# Patient Record
Sex: MALE | Race: WHITE
[De-identification: names, ages, dates, MRNs, and addresses within clinical notes are randomized per-mention and may not be internally consistent; named-entity substitution may affect disease eponyms.]

---

## 2018-09-26 PROBLEM — Z00.00 ENCOUNTER FOR PREVENTIVE HEALTH EXAMINATION: Status: ACTIVE | Noted: 2018-09-26

## 2018-12-03 ENCOUNTER — APPOINTMENT (OUTPATIENT)
Dept: UROLOGY | Facility: CLINIC | Age: 58
End: 2018-12-03
Payer: MEDICAID

## 2018-12-03 ENCOUNTER — TRANSCRIPTION ENCOUNTER (OUTPATIENT)
Age: 58
End: 2018-12-03

## 2018-12-03 ENCOUNTER — OUTPATIENT (OUTPATIENT)
Dept: OUTPATIENT SERVICES | Facility: HOSPITAL | Age: 58
LOS: 1 days | Discharge: HOME | End: 2018-12-03

## 2018-12-03 VITALS
HEIGHT: 71 IN | HEART RATE: 69 BPM | DIASTOLIC BLOOD PRESSURE: 72 MMHG | SYSTOLIC BLOOD PRESSURE: 114 MMHG | BODY MASS INDEX: 30.8 KG/M2 | WEIGHT: 220 LBS

## 2018-12-03 DIAGNOSIS — Z78.9 OTHER SPECIFIED HEALTH STATUS: ICD-10-CM

## 2018-12-03 DIAGNOSIS — Z12.5 ENCOUNTER FOR SCREENING FOR MALIGNANT NEOPLASM OF PROSTATE: ICD-10-CM

## 2018-12-03 PROCEDURE — 99204 OFFICE O/P NEW MOD 45 MIN: CPT

## 2018-12-03 RX ORDER — LORATADINE 10 MG/1
10 TABLET ORAL
Qty: 20 | Refills: 0 | Status: ACTIVE | COMMUNITY
Start: 2018-08-26

## 2018-12-03 RX ORDER — MELOXICAM 15 MG/1
15 TABLET ORAL
Qty: 14 | Refills: 0 | Status: ACTIVE | COMMUNITY
Start: 2018-07-01

## 2018-12-03 RX ORDER — IBUPROFEN 800 MG/1
800 TABLET, FILM COATED ORAL
Qty: 28 | Refills: 0 | Status: ACTIVE | COMMUNITY
Start: 2018-10-04

## 2018-12-03 RX ORDER — FLUTICASONE PROPIONATE 50 UG/1
50 SPRAY, METERED NASAL
Qty: 16 | Refills: 0 | Status: ACTIVE | COMMUNITY
Start: 2018-08-26

## 2018-12-03 NOTE — HISTORY OF PRESENT ILLNESS
[Currently Experiencing ___] :  [unfilled] [Urinary Retention] : urinary retention [Urinary Urgency] : urinary urgency [Urinary Frequency] : urinary frequency [Nocturia] : nocturia [Straining] : straining [Weak Stream] : weak stream [None] : None [FreeTextEntry1] : HALI MIMS is a 58 year old male with a past medical history of BPH with LUTS and had procedure done in 2016 by previous urologist . Presents to the office today for severe LUTS x 5 years, worsening over time. Patient last seen urologist 2 years ago and was told he has an enlarged prostate and had procedure to shrink prostate, which improved urinary condition but not resolved. Denies gross hematuria and dysuria. Quit smoking 2 years ago, smoked 2-3 packs a day 40 years. Denies family history of prostate, bladder, and renal cancer. \par \par took for flomax for 4 months in 2016 and said didn’t like because of retrograde ejaculation and weaker erections. \par \par [facility] chart records accessed and reviewed at [email or web address]  Findings summarized below: \par \par 5/23/2018\par -PSA 1.1\par -UA.- trace leukocytes, no blood\par -HgA1c.- 5.3 %\par \par IPSS Score=25\par Incomplete Emptying=4\par Frequency=4\par Intermittency=4\par Urgency=4\par Weak Stream=4\par Straining=3\par Nocturia=2\par QOL=MOSTLY UNSATISFIED\par  [Urinary Incontinence] : no urinary incontinence [Intermittency] : no intermittency [Post-Void Dribbling] : no post-void dribbling [Erectile Dysfunction] : no Erectile Dysfunction [Dysuria] : no dysuria [Hematuria - Gross] : no gross hematuria

## 2018-12-03 NOTE — ASSESSMENT
[FreeTextEntry1] : HALI MIMS is a 58 year old male with a past medical history of BPH with LUTS and had procedure done in 2016 by previous urologist . Presents to the office today for severe LUTS x 5 years, worsening over time. Patient last seen urologist 2 years ago and was told he has an enlarged prostate and had procedure to shrink prostate, which improved urinary condition but not resolved. Denies gross hematuria and dysuria. Quit smoking 2 years ago, smoked 2-3 packs a day 40 years. Denies family history of prostate, bladder, and renal cancer. \par \par 5/23/2018\par -PSA 1.1\par -UA.- trace leukocytes, no blood\par -HgA1c.- 5.3 %\par \par IPSS Score=25\par Incomplete Emptying=4\par Frequency=4\par Intermittency=4\par Urgency=4\par Weak Stream=4\par Straining=3\par Nocturia=2\par QOL=MOSTLY UNSATISFIED

## 2018-12-03 NOTE — PHYSICAL EXAM
[General Appearance - Well Developed] : well developed [General Appearance - Well Nourished] : well nourished [Normal Appearance] : normal appearance [Well Groomed] : well groomed [General Appearance - In No Acute Distress] : no acute distress [Edema] : no peripheral edema [Respiration, Rhythm And Depth] : normal respiratory rhythm and effort [Exaggerated Use Of Accessory Muscles For Inspiration] : no accessory muscle use [Abdomen Soft] : soft [Abdomen Tenderness] : non-tender [Costovertebral Angle Tenderness] : no ~M costovertebral angle tenderness [Normal Station and Gait] : the gait and station were normal for the patient's age [] : no rash [No Focal Deficits] : no focal deficits [Oriented To Time, Place, And Person] : oriented to person, place, and time [Affect] : the affect was normal [Mood] : the mood was normal [Not Anxious] : not anxious [No Prostate Nodules] : no prostate nodules [Prostate Size ___ gm] : prostate size [unfilled] gm [Skin Color & Pigmentation] : normal skin color and pigmentation

## 2018-12-03 NOTE — LETTER BODY
[Dear  ___] : Dear  [unfilled], [Consult Letter:] : I had the pleasure of evaluating your patient, [unfilled]. [Please see my note below.] : Please see my note below. [Consult Closing:] : Thank you very much for allowing me to participate in the care of this patient.  If you have any questions, please do not hesitate to contact me. [Sincerely,] : Sincerely, [FreeTextEntry2] : Dr. Zachary Pollard [FreeTextEntry1] : Will assess prostate via imaging. if smaller than 80g may benefet from Urolift as this will preserve sexual function and allow for better urine drainage [FreeTextEntry3] : Alonzo Wilkerson MD\par Director of Male Sexual Dysfunction and Urologic Prosthetics

## 2018-12-04 DIAGNOSIS — N40.1 BENIGN PROSTATIC HYPERPLASIA WITH LOWER URINARY TRACT SYMPTOMS: ICD-10-CM

## 2018-12-05 LAB
APPEARANCE: CLEAR
BILIRUBIN URINE: NEGATIVE
BLOOD URINE: NEGATIVE
COLOR: YELLOW
GLUCOSE QUALITATIVE U: NEGATIVE MG/DL
KETONES URINE: NEGATIVE
LEUKOCYTE ESTERASE URINE: NEGATIVE
NITRITE URINE: NEGATIVE
PH URINE: 6
PROTEIN URINE: NEGATIVE MG/DL
SPECIFIC GRAVITY URINE: 1.01
UROBILINOGEN URINE: 0.2 MG/DL (ref 0.2–?)

## 2018-12-06 LAB — BACTERIA UR CULT: NORMAL

## 2018-12-07 ENCOUNTER — OTHER (OUTPATIENT)
Age: 58
End: 2018-12-07

## 2018-12-09 ENCOUNTER — FORM ENCOUNTER (OUTPATIENT)
Age: 58
End: 2018-12-09

## 2018-12-10 ENCOUNTER — OUTPATIENT (OUTPATIENT)
Dept: OUTPATIENT SERVICES | Facility: HOSPITAL | Age: 58
LOS: 1 days | Discharge: HOME | End: 2018-12-10

## 2018-12-10 ENCOUNTER — APPOINTMENT (OUTPATIENT)
Dept: UROLOGY | Facility: CLINIC | Age: 58
End: 2018-12-10
Payer: MEDICAID

## 2018-12-10 VITALS
WEIGHT: 220 LBS | HEART RATE: 64 BPM | HEIGHT: 71 IN | BODY MASS INDEX: 30.8 KG/M2 | SYSTOLIC BLOOD PRESSURE: 126 MMHG | DIASTOLIC BLOOD PRESSURE: 82 MMHG

## 2018-12-10 DIAGNOSIS — N40.1 BENIGN PROSTATIC HYPERPLASIA WITH LOWER URINARY TRACT SYMPTOMS: ICD-10-CM

## 2018-12-10 PROCEDURE — 99213 OFFICE O/P EST LOW 20 MIN: CPT

## 2018-12-10 PROCEDURE — 51741 ELECTRO-UROFLOWMETRY FIRST: CPT

## 2018-12-10 PROCEDURE — 51798 US URINE CAPACITY MEASURE: CPT

## 2018-12-14 LAB
BILIRUB UR QL STRIP: NORMAL
CLARITY UR: CLEAR
COLLECTION METHOD: NORMAL
GLUCOSE UR-MCNC: NORMAL
HCG UR QL: NORMAL EU/DL
HGB UR QL STRIP.AUTO: NORMAL
KETONES UR-MCNC: NORMAL
LEUKOCYTE ESTERASE UR QL STRIP: NORMAL
NITRITE UR QL STRIP: NORMAL
PH UR STRIP: 6
PROT UR STRIP-MCNC: NORMAL
SP GR UR STRIP: 1

## 2019-01-23 ENCOUNTER — APPOINTMENT (OUTPATIENT)
Dept: UROLOGY | Facility: CLINIC | Age: 59
End: 2019-01-23
Payer: MEDICAID

## 2019-01-23 DIAGNOSIS — N40.1 BENIGN PROSTATIC HYPERPLASIA WITH LOWER URINARY TRACT SYMPMS: ICD-10-CM

## 2019-01-23 DIAGNOSIS — R35.0 FREQUENCY OF MICTURITION: ICD-10-CM

## 2019-01-23 DIAGNOSIS — N13.8 BENIGN PROSTATIC HYPERPLASIA WITH LOWER URINARY TRACT SYMPMS: ICD-10-CM

## 2019-01-23 PROCEDURE — 99212 OFFICE O/P EST SF 10 MIN: CPT | Mod: 25

## 2019-01-23 PROCEDURE — 52000 CYSTOURETHROSCOPY: CPT

## 2019-01-23 RX ORDER — TAMSULOSIN HYDROCHLORIDE 0.4 MG/1
0.4 CAPSULE ORAL
Qty: 30 | Refills: 3 | Status: ACTIVE | COMMUNITY
Start: 2019-01-23 | End: 1900-01-01

## 2019-01-23 RX ORDER — AMOXICILLIN AND CLAVULANATE POTASSIUM 875; 125 MG/1; MG/1
875-125 TABLET, COATED ORAL
Qty: 14 | Refills: 0 | Status: DISCONTINUED | COMMUNITY
Start: 2018-08-26 | End: 2019-01-23

## 2019-01-23 RX ORDER — AMOXICILLIN 500 MG/1
500 CAPSULE ORAL
Qty: 21 | Refills: 0 | Status: DISCONTINUED | COMMUNITY
Start: 2018-10-04 | End: 2019-01-23

## 2019-01-23 NOTE — HISTORY OF PRESENT ILLNESS
[FreeTextEntry1] : HALI MIMS is a 58 year old male with a past medical history of BPH with LUTS and had procedure done in 2016 by previous urologist. Presents to the office today for severe LUTS x 5 years, worsening over time. Patient last seen urologist 2 years ago and was told he has an enlarged prostate and had procedure to shrink prostate, which improved urinary condition but not resolved. Denies gross hematuria and dysuria. Quit smoking 2 years ago, smoked 2-3 packs a day 40 years. Denies family history of prostate, bladder, and renal cancer. \par \par 5/23/2018\par -PSA 1.1\par -UA.- trace leukocytes, no blood\par -HgA1c.- 5.3 %\par \par IPSS Score=25\par Incomplete Emptying=4\par Frequency=4\par Intermittency=4\par Urgency=4\par Weak Stream=4\par Straining=3\par Nocturia=2\par QOL=MOSTLY UNSATISFIED. \par \par Culture - Urine;- no growth\par \par US Bladder/renal-- Dec 2019 -- normal renal sonogram.  PVR 40ml. prostate 52g\par \par uroflow and pvr-- max flow 21ml/s, but erratic with many peaks and troughs and overall flat\par pvr 58 ml. \par \par cystoscopy today showed no urethral strictures and trilobar hypertrophy -- was very sensitive to cystoscopy

## 2019-01-23 NOTE — PHYSICAL EXAM
[General Appearance - Well Developed] : well developed [General Appearance - Well Nourished] : well nourished [Normal Appearance] : normal appearance [Well Groomed] : well groomed [General Appearance - In No Acute Distress] : no acute distress [Abdomen Soft] : soft [Abdomen Tenderness] : non-tender [Costovertebral Angle Tenderness] : no ~M costovertebral angle tenderness [No Prostate Nodules] : no prostate nodules [Prostate Size ___ gm] : prostate size [unfilled] gm [Skin Color & Pigmentation] : normal skin color and pigmentation [Edema] : no peripheral edema [] : no respiratory distress [Respiration, Rhythm And Depth] : normal respiratory rhythm and effort [Exaggerated Use Of Accessory Muscles For Inspiration] : no accessory muscle use [Oriented To Time, Place, And Person] : oriented to person, place, and time [Affect] : the affect was normal [Mood] : the mood was normal [Not Anxious] : not anxious [Normal Station and Gait] : the gait and station were normal for the patient's age [No Focal Deficits] : no focal deficits

## 2019-01-23 NOTE — REVIEW OF SYSTEMS
[see HPI] : see HPI [Dysuria] : no dysuria [Incontinence] : no incontinence [Nocturia] : nocturia [Negative] : Endocrine

## 2019-04-26 ENCOUNTER — APPOINTMENT (OUTPATIENT)
Dept: UROLOGY | Facility: CLINIC | Age: 59
End: 2019-04-26

## 2019-10-15 ENCOUNTER — TRANSCRIPTION ENCOUNTER (OUTPATIENT)
Age: 59
End: 2019-10-15

## 2019-12-11 ENCOUNTER — HOSPITAL ENCOUNTER (EMERGENCY)
Facility: HOSPITAL | Age: 59
Discharge: HOME | End: 2019-12-11
Attending: EMERGENCY MEDICINE | Admitting: EMERGENCY MEDICINE
Payer: COMMERCIAL

## 2019-12-11 ENCOUNTER — APPOINTMENT (EMERGENCY)
Dept: CT IMAGING | Facility: HOSPITAL | Age: 59
End: 2019-12-11
Payer: COMMERCIAL

## 2019-12-11 VITALS
OXYGEN SATURATION: 97 % | DIASTOLIC BLOOD PRESSURE: 85 MMHG | RESPIRATION RATE: 16 BRPM | WEIGHT: 223 LBS | TEMPERATURE: 98.2 F | HEART RATE: 78 BPM | SYSTOLIC BLOOD PRESSURE: 151 MMHG

## 2019-12-11 DIAGNOSIS — M54.2 NECK PAIN: ICD-10-CM

## 2019-12-11 DIAGNOSIS — M79.18 MUSCULOSKELETAL PAIN: ICD-10-CM
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
## 2019-12-11 DIAGNOSIS — M54.9 BACK PAIN: ICD-10-CM

## 2019-12-11 LAB
ANION GAP BLD CALC-SCNC: 15 MMOL/L (ref 4–13)
BUN BLD-MCNC: 16 MG/DL (ref 5–25)
CA-I BLD-SCNC: 1.23 MMOL/L (ref 1.12–1.32)
CHLORIDE BLD-SCNC: 107 MMOL/L (ref 100–108)
CREAT BLD-MCNC: 1 MG/DL (ref 0.6–1.3)
GFR SERPL CREATININE-BSD FRML MDRD: 83 ML/MIN/1.73SQ M
GLUCOSE SERPL-MCNC: 99 MG/DL (ref 65–140)
HCT VFR BLD CALC: 39 % (ref 36.5–49.3)
HGB BLDA-MCNC: 13.3 G/DL (ref 12–17)
PCO2 BLD: 27 MMOL/L (ref 21–32)
POTASSIUM BLD-SCNC: 3.7 MMOL/L (ref 3.5–5.3)
SODIUM BLD-SCNC: 144 MMOL/L (ref 136–145)
SPECIMEN SOURCE: ABNORMAL

## 2019-12-11 PROCEDURE — 72125 CT NECK SPINE W/O DYE: CPT

## 2019-12-11 PROCEDURE — 99285 EMERGENCY DEPT VISIT HI MDM: CPT | Performed by: EMERGENCY MEDICINE

## 2019-12-11 PROCEDURE — 99284 EMERGENCY DEPT VISIT MOD MDM: CPT

## 2019-12-11 PROCEDURE — 74177 CT ABD & PELVIS W/CONTRAST: CPT

## 2019-12-11 PROCEDURE — 80047 BASIC METABLC PNL IONIZED CA: CPT

## 2019-12-11 PROCEDURE — 70450 CT HEAD/BRAIN W/O DYE: CPT

## 2019-12-11 PROCEDURE — 85014 HEMATOCRIT: CPT

## 2019-12-11 PROCEDURE — 71260 CT THORAX DX C+: CPT

## 2019-12-11 PROCEDURE — 96374 THER/PROPH/DIAG INJ IV PUSH: CPT

## 2019-12-11 RX ORDER — IBUPROFEN 600 MG/1
600 TABLET ORAL EVERY 6 HOURS PRN
Qty: 28 TABLET | Refills: 0 | Status: SHIPPED | OUTPATIENT
Start: 2019-12-11 | End: 2019-12-18

## 2019-12-11 RX ORDER — FENTANYL CITRATE 50 UG/ML
50 INJECTION, SOLUTION INTRAMUSCULAR; INTRAVENOUS ONCE
Status: COMPLETED | OUTPATIENT
Start: 2019-12-11 | End: 2019-12-11

## 2019-12-11 RX ADMIN — IOHEXOL 100 ML: 350 INJECTION, SOLUTION INTRAVENOUS at 17:46

## 2019-12-11 RX ADMIN — FENTANYL CITRATE 50 MCG: 50 INJECTION, SOLUTION INTRAMUSCULAR; INTRAVENOUS at 18:05
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
## 2020-05-22 ENCOUNTER — OUTPATIENT (OUTPATIENT)
Dept: OUTPATIENT SERVICES | Facility: HOSPITAL | Age: 60
LOS: 1 days | Discharge: HOME | End: 2020-05-22

## 2020-05-22 DIAGNOSIS — H90.3 SENSORINEURAL HEARING LOSS, BILATERAL: ICD-10-CM

## 2020-06-21 ENCOUNTER — TRANSCRIPTION ENCOUNTER (OUTPATIENT)
Age: 60
End: 2020-06-21

## 2020-07-07 ENCOUNTER — OUTPATIENT (OUTPATIENT)
Dept: OUTPATIENT SERVICES | Facility: HOSPITAL | Age: 60
LOS: 1 days | Discharge: HOME | End: 2020-07-07

## 2020-07-07 DIAGNOSIS — H90.3 SENSORINEURAL HEARING LOSS, BILATERAL: ICD-10-CM

## 2020-10-01 ENCOUNTER — OUTPATIENT (OUTPATIENT)
Dept: OUTPATIENT SERVICES | Facility: HOSPITAL | Age: 60
LOS: 1 days | End: 2020-10-01
Payer: MEDICAID

## 2020-10-27 DIAGNOSIS — Z71.89 OTHER SPECIFIED COUNSELING: ICD-10-CM

## 2021-01-04 ENCOUNTER — TRANSCRIPTION ENCOUNTER (OUTPATIENT)
Age: 61
End: 2021-01-04

## 2021-01-04 ENCOUNTER — EMERGENCY (EMERGENCY)
Facility: HOSPITAL | Age: 61
LOS: 0 days | Discharge: HOME | End: 2021-01-04
Attending: EMERGENCY MEDICINE | Admitting: EMERGENCY MEDICINE
Payer: MEDICAID

## 2021-01-04 VITALS
OXYGEN SATURATION: 99 % | TEMPERATURE: 99 F | RESPIRATION RATE: 18 BRPM | SYSTOLIC BLOOD PRESSURE: 132 MMHG | HEART RATE: 87 BPM | DIASTOLIC BLOOD PRESSURE: 75 MMHG

## 2021-01-04 VITALS
HEART RATE: 81 BPM | SYSTOLIC BLOOD PRESSURE: 121 MMHG | OXYGEN SATURATION: 96 % | TEMPERATURE: 98 F | DIASTOLIC BLOOD PRESSURE: 78 MMHG

## 2021-01-04 DIAGNOSIS — R06.02 SHORTNESS OF BREATH: ICD-10-CM

## 2021-01-04 DIAGNOSIS — U07.1 COVID-19: ICD-10-CM

## 2021-01-04 LAB
ALBUMIN SERPL ELPH-MCNC: 4.2 G/DL — SIGNIFICANT CHANGE UP (ref 3.5–5.2)
ALP SERPL-CCNC: 91 U/L — SIGNIFICANT CHANGE UP (ref 30–115)
ALT FLD-CCNC: 20 U/L — SIGNIFICANT CHANGE UP (ref 0–41)
ANION GAP SERPL CALC-SCNC: 10 MMOL/L — SIGNIFICANT CHANGE UP (ref 7–14)
AST SERPL-CCNC: 21 U/L — SIGNIFICANT CHANGE UP (ref 0–41)
BASOPHILS # BLD AUTO: 0.02 K/UL — SIGNIFICANT CHANGE UP (ref 0–0.2)
BASOPHILS NFR BLD AUTO: 0.4 % — SIGNIFICANT CHANGE UP (ref 0–1)
BILIRUB SERPL-MCNC: <0.2 MG/DL — SIGNIFICANT CHANGE UP (ref 0.2–1.2)
BUN SERPL-MCNC: 15 MG/DL — SIGNIFICANT CHANGE UP (ref 10–20)
CALCIUM SERPL-MCNC: 9 MG/DL — SIGNIFICANT CHANGE UP (ref 8.5–10.1)
CHLORIDE SERPL-SCNC: 105 MMOL/L — SIGNIFICANT CHANGE UP (ref 98–110)
CO2 SERPL-SCNC: 24 MMOL/L — SIGNIFICANT CHANGE UP (ref 17–32)
CREAT SERPL-MCNC: 0.9 MG/DL — SIGNIFICANT CHANGE UP (ref 0.7–1.5)
EOSINOPHIL # BLD AUTO: 0.1 K/UL — SIGNIFICANT CHANGE UP (ref 0–0.7)
EOSINOPHIL NFR BLD AUTO: 2.2 % — SIGNIFICANT CHANGE UP (ref 0–8)
GLUCOSE SERPL-MCNC: 78 MG/DL — SIGNIFICANT CHANGE UP (ref 70–99)
HCT VFR BLD CALC: 45.3 % — SIGNIFICANT CHANGE UP (ref 42–52)
HGB BLD-MCNC: 14.5 G/DL — SIGNIFICANT CHANGE UP (ref 14–18)
IMM GRANULOCYTES NFR BLD AUTO: 0.2 % — SIGNIFICANT CHANGE UP (ref 0.1–0.3)
LYMPHOCYTES # BLD AUTO: 1.41 K/UL — SIGNIFICANT CHANGE UP (ref 1.2–3.4)
LYMPHOCYTES # BLD AUTO: 30.7 % — SIGNIFICANT CHANGE UP (ref 20.5–51.1)
MCHC RBC-ENTMCNC: 28.7 PG — SIGNIFICANT CHANGE UP (ref 27–31)
MCHC RBC-ENTMCNC: 32 G/DL — SIGNIFICANT CHANGE UP (ref 32–37)
MCV RBC AUTO: 89.7 FL — SIGNIFICANT CHANGE UP (ref 80–94)
MONOCYTES # BLD AUTO: 0.58 K/UL — SIGNIFICANT CHANGE UP (ref 0.1–0.6)
MONOCYTES NFR BLD AUTO: 12.6 % — HIGH (ref 1.7–9.3)
NEUTROPHILS # BLD AUTO: 2.48 K/UL — SIGNIFICANT CHANGE UP (ref 1.4–6.5)
NEUTROPHILS NFR BLD AUTO: 53.9 % — SIGNIFICANT CHANGE UP (ref 42.2–75.2)
NRBC # BLD: 0 /100 WBCS — SIGNIFICANT CHANGE UP (ref 0–0)
PLATELET # BLD AUTO: 250 K/UL — SIGNIFICANT CHANGE UP (ref 130–400)
POTASSIUM SERPL-MCNC: 4.8 MMOL/L — SIGNIFICANT CHANGE UP (ref 3.5–5)
POTASSIUM SERPL-SCNC: 4.8 MMOL/L — SIGNIFICANT CHANGE UP (ref 3.5–5)
PROT SERPL-MCNC: 7.2 G/DL — SIGNIFICANT CHANGE UP (ref 6–8)
RBC # BLD: 5.05 M/UL — SIGNIFICANT CHANGE UP (ref 4.7–6.1)
RBC # FLD: 13.2 % — SIGNIFICANT CHANGE UP (ref 11.5–14.5)
SODIUM SERPL-SCNC: 139 MMOL/L — SIGNIFICANT CHANGE UP (ref 135–146)
TROPONIN T SERPL-MCNC: <0.01 NG/ML — SIGNIFICANT CHANGE UP
WBC # BLD: 4.6 K/UL — LOW (ref 4.8–10.8)
WBC # FLD AUTO: 4.6 K/UL — LOW (ref 4.8–10.8)

## 2021-01-04 PROCEDURE — 71045 X-RAY EXAM CHEST 1 VIEW: CPT | Mod: 26

## 2021-01-04 PROCEDURE — 93010 ELECTROCARDIOGRAM REPORT: CPT

## 2021-01-04 PROCEDURE — 99285 EMERGENCY DEPT VISIT HI MDM: CPT

## 2021-01-04 RX ORDER — KETOROLAC TROMETHAMINE 30 MG/ML
30 SYRINGE (ML) INJECTION ONCE
Refills: 0 | Status: DISCONTINUED | OUTPATIENT
Start: 2021-01-04 | End: 2021-01-04

## 2021-01-04 RX ORDER — SODIUM CHLORIDE 9 MG/ML
1000 INJECTION INTRAMUSCULAR; INTRAVENOUS; SUBCUTANEOUS ONCE
Refills: 0 | Status: COMPLETED | OUTPATIENT
Start: 2021-01-04 | End: 2021-01-04

## 2021-01-04 RX ORDER — DEXAMETHASONE 0.5 MG/5ML
10 ELIXIR ORAL ONCE
Refills: 0 | Status: COMPLETED | OUTPATIENT
Start: 2021-01-04 | End: 2021-01-04

## 2021-01-04 RX ADMIN — SODIUM CHLORIDE 1000 MILLILITER(S): 9 INJECTION INTRAMUSCULAR; INTRAVENOUS; SUBCUTANEOUS at 15:08

## 2021-01-04 RX ADMIN — Medication 10 MILLIGRAM(S): at 15:00

## 2021-01-04 RX ADMIN — Medication 30 MILLIGRAM(S): at 15:09

## 2021-01-04 NOTE — ED PROVIDER NOTE - CARE PROVIDER_API CALL
Zachary Pollard  INTERNAL MEDICINE  [address] [place] [address]  Phone: [phone]  Fax: [phone]  Follow Up Time:

## 2021-01-04 NOTE — ED PROVIDER NOTE - PATIENT PORTAL LINK FT
You can access the FollowMyHealth Patient Portal offered by [facility] by registering at the following website: [email or web address]. By joining [facility]’s FollowMyHealth portal, you will also be able to view your health information using other applications (apps) compatible with our system.

## 2021-01-04 NOTE — ED PROVIDER NOTE - CLINICAL SUMMARY MEDICAL DECISION MAKING FREE TEXT BOX
ed work up unremarkable, pt feels better, dc home w supportive care, will place on TEAMS for f/u as outpt. strict return precautions provided.

## 2021-01-04 NOTE — ED PROVIDER NOTE - ATTENDING CONTRIBUTION TO CARE
60M no PMH, exsmoker, ex ETOH abuse sober x 5 years, covid + x 1 week, p/w fever, cough, body aches, lack of smell/taste, achy cp when coughing and sob. has a pulse ox just delivered today by the [facility] per pt. no abd pain, nvdc. no dysuria, freq, hematuria.     on exam, AFVSS, well wilbert nad, ncat, eomi, perrla, mmm, lctab, rrr nl s1s2 no mrg, abd soft ntnd, aaox3, no focal deficits, no le edema or calf ttp,     a/p; COVID, will do labs, ekg/trop, CXR, decadron re-eval. No resp distress or hypoxia

## 2021-01-04 NOTE — ED PROVIDER NOTE - OBJECTIVE STATEMENT
60y M no medical problems presenting with SOB, cough, body aches, headaches, chills x 4 days. Pt is +COVID 12/28. Sick contact with step father. No chest pain. No leg swelling. no abd pain, nvdc. no dysuria, freq, hematuria.

## 2021-01-04 NOTE — ED PROVIDER NOTE - NS ED ROS FT
Eyes:  No visual changes, eye pain or discharge.  ENMT:  No hearing changes, pain, no sore throat or runny nose, no difficulty swallowing  Cardiac:  No chest pain, or edema. No chest pain with exertion.  Respiratory:  No respiratory distress. No hemoptysis. No history of asthma or RAD.  GI:  No nausea, vomiting, diarrhea or abdominal pain.  :  No dysuria, frequency or burning.  MS:  see HPI  Neuro: No LOC.  Skin:  No skin rash.   Endocrine: No history of thyroid disease or diabetes.

## 2021-01-05 ENCOUNTER — TRANSCRIPTION ENCOUNTER (OUTPATIENT)
Age: 61
End: 2021-01-05

## 2021-01-08 ENCOUNTER — INPATIENT (INPATIENT)
Facility: HOSPITAL | Age: 61
LOS: 13 days | Discharge: HOME | End: 2021-01-22
Attending: INTERNAL MEDICINE | Admitting: INTERNAL MEDICINE
Payer: MEDICAID

## 2021-01-08 ENCOUNTER — TRANSCRIPTION ENCOUNTER (OUTPATIENT)
Age: 61
End: 2021-01-08

## 2021-01-08 VITALS
DIASTOLIC BLOOD PRESSURE: 83 MMHG | OXYGEN SATURATION: 99 % | TEMPERATURE: 98 F | RESPIRATION RATE: 20 BRPM | HEART RATE: 92 BPM | SYSTOLIC BLOOD PRESSURE: 138 MMHG

## 2021-01-08 LAB
ALBUMIN SERPL ELPH-MCNC: 3.8 G/DL — SIGNIFICANT CHANGE UP (ref 3.5–5.2)
ALP SERPL-CCNC: 98 U/L — SIGNIFICANT CHANGE UP (ref 30–115)
ALT FLD-CCNC: 28 U/L — SIGNIFICANT CHANGE UP (ref 0–41)
ANION GAP SERPL CALC-SCNC: 12 MMOL/L — SIGNIFICANT CHANGE UP (ref 7–14)
AST SERPL-CCNC: 33 U/L — SIGNIFICANT CHANGE UP (ref 0–41)
BASE EXCESS BLDV CALC-SCNC: 1.9 MMOL/L — SIGNIFICANT CHANGE UP (ref -2–2)
BASOPHILS # BLD AUTO: 0.02 K/UL — SIGNIFICANT CHANGE UP (ref 0–0.2)
BASOPHILS NFR BLD AUTO: 0.4 % — SIGNIFICANT CHANGE UP (ref 0–1)
BILIRUB SERPL-MCNC: 0.2 MG/DL — SIGNIFICANT CHANGE UP (ref 0.2–1.2)
BUN SERPL-MCNC: 13 MG/DL — SIGNIFICANT CHANGE UP (ref 10–20)
CA-I SERPL-SCNC: 1.22 MMOL/L — SIGNIFICANT CHANGE UP (ref 1.12–1.3)
CALCIUM SERPL-MCNC: 9.1 MG/DL — SIGNIFICANT CHANGE UP (ref 8.5–10.1)
CHLORIDE SERPL-SCNC: 103 MMOL/L — SIGNIFICANT CHANGE UP (ref 98–110)
CO2 SERPL-SCNC: 22 MMOL/L — SIGNIFICANT CHANGE UP (ref 17–32)
CREAT SERPL-MCNC: 0.8 MG/DL — SIGNIFICANT CHANGE UP (ref 0.7–1.5)
D DIMER BLD IA.RAPID-MCNC: 369 NG/ML DDU — HIGH (ref 0–230)
EOSINOPHIL # BLD AUTO: 0.06 K/UL — SIGNIFICANT CHANGE UP (ref 0–0.7)
EOSINOPHIL NFR BLD AUTO: 1.3 % — SIGNIFICANT CHANGE UP (ref 0–8)
GAS PNL BLDV: 141 MMOL/L — SIGNIFICANT CHANGE UP (ref 136–145)
GAS PNL BLDV: SIGNIFICANT CHANGE UP
GAS PNL BLDV: SIGNIFICANT CHANGE UP
GLUCOSE SERPL-MCNC: 82 MG/DL — SIGNIFICANT CHANGE UP (ref 70–99)
HCO3 BLDV-SCNC: 26 MMOL/L — SIGNIFICANT CHANGE UP (ref 22–29)
HCT VFR BLD CALC: 42.4 % — SIGNIFICANT CHANGE UP (ref 42–52)
HCT VFR BLDA CALC: 33.3 % — LOW (ref 34–44)
HGB BLD CALC-MCNC: 10.9 G/DL — LOW (ref 14–18)
HGB BLD-MCNC: 14.1 G/DL — SIGNIFICANT CHANGE UP (ref 14–18)
IMM GRANULOCYTES NFR BLD AUTO: 0.6 % — HIGH (ref 0.1–0.3)
LACTATE BLDV-MCNC: 1.7 MMOL/L — HIGH (ref 0.5–1.6)
LYMPHOCYTES # BLD AUTO: 1.5 K/UL — SIGNIFICANT CHANGE UP (ref 1.2–3.4)
LYMPHOCYTES # BLD AUTO: 32.2 % — SIGNIFICANT CHANGE UP (ref 20.5–51.1)
MCHC RBC-ENTMCNC: 28.4 PG — SIGNIFICANT CHANGE UP (ref 27–31)
MCHC RBC-ENTMCNC: 33.3 G/DL — SIGNIFICANT CHANGE UP (ref 32–37)
MCV RBC AUTO: 85.5 FL — SIGNIFICANT CHANGE UP (ref 80–94)
MONOCYTES # BLD AUTO: 0.55 K/UL — SIGNIFICANT CHANGE UP (ref 0.1–0.6)
MONOCYTES NFR BLD AUTO: 11.8 % — HIGH (ref 1.7–9.3)
NEUTROPHILS # BLD AUTO: 2.5 K/UL — SIGNIFICANT CHANGE UP (ref 1.4–6.5)
NEUTROPHILS NFR BLD AUTO: 53.7 % — SIGNIFICANT CHANGE UP (ref 42.2–75.2)
NRBC # BLD: 0 /100 WBCS — SIGNIFICANT CHANGE UP (ref 0–0)
NT-PROBNP SERPL-SCNC: 19 PG/ML — SIGNIFICANT CHANGE UP (ref 0–300)
PCO2 BLDV: 35 MMHG — LOW (ref 41–51)
PH BLDV: 7.47 — HIGH (ref 7.26–7.43)
PLATELET # BLD AUTO: 239 K/UL — SIGNIFICANT CHANGE UP (ref 130–400)
PO2 BLDV: 43 MMHG — HIGH (ref 20–40)
POTASSIUM BLDV-SCNC: 4.2 MMOL/L — SIGNIFICANT CHANGE UP (ref 3.3–5.6)
POTASSIUM SERPL-MCNC: 4.7 MMOL/L — SIGNIFICANT CHANGE UP (ref 3.5–5)
POTASSIUM SERPL-SCNC: 4.7 MMOL/L — SIGNIFICANT CHANGE UP (ref 3.5–5)
PROT SERPL-MCNC: 6.5 G/DL — SIGNIFICANT CHANGE UP (ref 6–8)
RBC # BLD: 4.96 M/UL — SIGNIFICANT CHANGE UP (ref 4.7–6.1)
RBC # FLD: 13.2 % — SIGNIFICANT CHANGE UP (ref 11.5–14.5)
SAO2 % BLDV: 82 % — SIGNIFICANT CHANGE UP
SARS-COV-2 RNA SPEC QL NAA+PROBE: DETECTED
SODIUM SERPL-SCNC: 137 MMOL/L — SIGNIFICANT CHANGE UP (ref 135–146)
TROPONIN T SERPL-MCNC: <0.01 NG/ML — SIGNIFICANT CHANGE UP
WBC # BLD: 4.66 K/UL — LOW (ref 4.8–10.8)
WBC # FLD AUTO: 4.66 K/UL — LOW (ref 4.8–10.8)

## 2021-01-08 PROCEDURE — 99291 CRITICAL CARE FIRST HOUR: CPT

## 2021-01-08 PROCEDURE — 99221 1ST HOSP IP/OBS SF/LOW 40: CPT | Mod: AI,GC

## 2021-01-08 PROCEDURE — 71275 CT ANGIOGRAPHY CHEST: CPT | Mod: 26

## 2021-01-08 PROCEDURE — 93010 ELECTROCARDIOGRAM REPORT: CPT

## 2021-01-08 PROCEDURE — 71045 X-RAY EXAM CHEST 1 VIEW: CPT | Mod: 26

## 2021-01-08 RX ORDER — DEXAMETHASONE 0.5 MG/5ML
6 ELIXIR ORAL DAILY
Refills: 0 | Status: DISCONTINUED | OUTPATIENT
Start: 2021-01-09 | End: 2021-01-09

## 2021-01-08 RX ORDER — ENOXAPARIN SODIUM 100 MG/ML
40 INJECTION SUBCUTANEOUS DAILY
Refills: 0 | Status: DISCONTINUED | OUTPATIENT
Start: 2021-01-08 | End: 2021-01-09

## 2021-01-08 RX ORDER — MORPHINE SULFATE 50 MG/1
2 CAPSULE, EXTENDED RELEASE ORAL EVERY 6 HOURS
Refills: 0 | Status: DISCONTINUED | OUTPATIENT
Start: 2021-01-08 | End: 2021-01-09

## 2021-01-08 RX ORDER — SODIUM CHLORIDE 9 MG/ML
1000 INJECTION, SOLUTION INTRAVENOUS ONCE
Refills: 0 | Status: COMPLETED | OUTPATIENT
Start: 2021-01-08 | End: 2021-01-08

## 2021-01-08 RX ORDER — MAGNESIUM SULFATE 500 MG/ML
2 VIAL (ML) INJECTION ONCE
Refills: 0 | Status: COMPLETED | OUTPATIENT
Start: 2021-01-08 | End: 2021-01-08

## 2021-01-08 RX ORDER — ALBUTEROL 90 UG/1
2 AEROSOL, METERED ORAL
Refills: 0 | Status: COMPLETED | OUTPATIENT
Start: 2021-01-08 | End: 2021-01-08

## 2021-01-08 RX ORDER — IPRATROPIUM/ALBUTEROL SULFATE 18-103MCG
9 AEROSOL WITH ADAPTER (GRAM) INHALATION ONCE
Refills: 0 | Status: COMPLETED | OUTPATIENT
Start: 2021-01-08 | End: 2021-01-08

## 2021-01-08 RX ORDER — MIDAZOLAM HYDROCHLORIDE 1 MG/ML
1 INJECTION, SOLUTION INTRAMUSCULAR; INTRAVENOUS ONCE
Refills: 0 | Status: DISCONTINUED | OUTPATIENT
Start: 2021-01-08 | End: 2021-01-08

## 2021-01-08 RX ORDER — FAMOTIDINE 10 MG/ML
20 INJECTION INTRAVENOUS DAILY
Refills: 0 | Status: DISCONTINUED | OUTPATIENT
Start: 2021-01-08 | End: 2021-01-09

## 2021-01-08 RX ORDER — IPRATROPIUM/ALBUTEROL SULFATE 18-103MCG
3 AEROSOL WITH ADAPTER (GRAM) INHALATION EVERY 6 HOURS
Refills: 0 | Status: DISCONTINUED | OUTPATIENT
Start: 2021-01-08 | End: 2021-01-22

## 2021-01-08 RX ORDER — CHLORHEXIDINE GLUCONATE 213 G/1000ML
1 SOLUTION TOPICAL
Refills: 0 | Status: DISCONTINUED | OUTPATIENT
Start: 2021-01-08 | End: 2021-01-22

## 2021-01-08 RX ORDER — TIOTROPIUM BROMIDE 18 UG/1
1 CAPSULE ORAL; RESPIRATORY (INHALATION) DAILY
Refills: 0 | Status: DISCONTINUED | OUTPATIENT
Start: 2021-01-08 | End: 2021-01-22

## 2021-01-08 RX ORDER — MORPHINE SULFATE 50 MG/1
4 CAPSULE, EXTENDED RELEASE ORAL ONCE
Refills: 0 | Status: DISCONTINUED | OUTPATIENT
Start: 2021-01-08 | End: 2021-01-08

## 2021-01-08 RX ADMIN — ALBUTEROL 2 PUFF(S): 90 AEROSOL, METERED ORAL at 16:22

## 2021-01-08 RX ADMIN — Medication 100 GRAM(S): at 17:42

## 2021-01-08 RX ADMIN — Medication 9 MILLILITER(S): at 17:43

## 2021-01-08 RX ADMIN — MIDAZOLAM HYDROCHLORIDE 1 MILLIGRAM(S): 1 INJECTION, SOLUTION INTRAMUSCULAR; INTRAVENOUS at 19:13

## 2021-01-08 RX ADMIN — Medication 125 MILLIGRAM(S): at 16:22

## 2021-01-08 RX ADMIN — MORPHINE SULFATE 4 MILLIGRAM(S): 50 CAPSULE, EXTENDED RELEASE ORAL at 22:19

## 2021-01-08 RX ADMIN — SODIUM CHLORIDE 1000 MILLILITER(S): 9 INJECTION, SOLUTION INTRAVENOUS at 16:22

## 2021-01-08 NOTE — ED PROVIDER NOTE - CLINICAL SUMMARY MEDICAL DECISION MAKING FREE TEXT BOX
pt presented for SOB, worsening, anxious, no PE on cta chest, 100 off o2, but increased wob, placed on bipap improved and improved off bipap, icu evaluated pt report regular floor, medical admitting ream aware of pt and admission.

## 2021-01-08 NOTE — ED ADULT NURSE NOTE - NSSUHOSCREENINGYN_ED_ALL_ED
----- Message from Kate Pandya sent at 8/13/2020  2:28 PM CDT -----  Regarding: Refill      Medication  Refill  Request      Please refill the medication(s) listed below. Please call the patient when the prescription(s) is ready for  at this phone number   [phone]       Medication #1   traMADoL (ULTRAM) 50 mg tablet      Preferred Pharmacy:   [facility]  [place] [address] [place] [phone] (Phone)  [phone] (Fax)        
Patient requesting refill on tramadol   Last office visit 07/28/20   shows last refill on 07/31/20  Patient does not have a pain contract on file with [facility]  Patient last UDS 02/08/20 was consistent with current therapy    
Yes - the patient is able to be screened

## 2021-01-08 NOTE — H&P ADULT - NSHPREVIEWOFSYSTEMS_GEN_ALL_CORE
CONSTITUTIONAL: As above  EYES/ENT: No visual changes;  No vertigo or throat pain   NECK: No pain or stiffness  RESPIRATORY: As above  CARDIOVASCULAR: No chest pain or palpitations  GASTROINTESTINAL: AS above  GENITOURINARY: No dysuria, frequency or hematuria  NEUROLOGICAL: No numbness or weakness  All other review of systems is negative unless indicated above.

## 2021-01-08 NOTE — H&P ADULT - NSHPLABSRESULTS_GEN_ALL_CORE
14.1   4.66  )-----------( 239      ( 08 Jan 2021 16:50 )             42.4       01-08    137  |  103  |  13  ----------------------------<  82  4.7   |  22  |  0.8    Ca    9.1      08 Jan 2021 16:50    TPro  6.5  /  Alb  3.8  /  TBili  0.2  /  DBili  x   /  AST  33  /  ALT  28  /  AlkPhos  98  01-08                      CARDIAC MARKERS ( 08 Jan 2021 16:50 )  x     / <0.01 ng/mL / x     / x     / x            CAPILLARY BLOOD GLUCOSE

## 2021-01-08 NOTE — ED PROVIDER NOTE - NS ED ROS FT
Constitutional: No fevers. +chills.   Eyes:  No visual changes, eye pain or discharge.  ENMT:  No sore throat or runny nose.  Cardiac:  +chest pain +sob  Respiratory:  +cough. hx of copd.   GI:  +diarrhea.   :  No dysuria, frequency or burning.  MS:  +myalgias.   Neuro:  No headache or weakness.  No LOC.  Skin:  No skin rash.   Endocrine: No history of thyroid disease or diabetes.

## 2021-01-08 NOTE — H&P ADULT - ATTENDING COMMENTS
HPI:  60 year old Male former smoker(2 packs since age of 17, quit 4 years ago) with past medical history of COPD not on home oxygen presents with worsening Shortness of Breath for one week.     Patient reports that since 27th he has been having intermittent fevers associated with dry cough, rigors, chills, loss of taste and smell. He tested positive on the 28th. For last one week he reports progressive shortness of breath, dry cough and increased inspiratory effort associated with sharp non radiating chest pain with deep inspiration. Patient also reports diarrhea for last 5 days watery, non bloody. He saw his PCP Dr. Pollard who referred him to ED. Patient denies any abdominal pain, hypoxia, numbness, tingling, dysuria or any other complaints.    In ED patient afebrile, hemodynamically stable, tachypneic to 30s placed on Bipap saturating 100% on 12/6/40%. CTA Chest negative for PE. In ED patient given one dose of Versed given to reduce the rate with improvement, Solumedrol 125, Magnesium and nebulizer treatments. At time of interview patient also anxious about prognosis and remains tachypneic. (08 Jan 2021 22:00)    REVIEW OF SYSTEMS:    CONSTITUTIONAL: No weakness, fevers or chills  EYES/ENT: No visual changes;  No vertigo or throat pain   NECK: No pain or stiffness  RESPIRATORY: No cough, wheezing, hemoptysis; No shortness of breath  CARDIOVASCULAR: No chest pain or palpitations  GASTROINTESTINAL: No abdominal or epigastric pain. No nausea, vomiting, or hematemesis; No diarrhea or constipation. No melena or hematochezia.  GENITOURINARY: No dysuria, frequency or hematuria  NEUROLOGICAL: No numbness or weakness  SKIN: No itching, rashes    Physical Exam:    General: WN/WD NAD  Neurology: A&Ox3, nonfocal, follows commands  Eyes: PERRLA/ EOMI  ENT/Neck: Neck supple, trachea midline, No JVD  Respiratory: CTA B/L, No wheezing, rales, rhonchi  CV: Normal rate regular rhythm, S1S2, no murmurs, rubs or gallops  Abdominal: Soft, NT, ND +BS,   Extremities: No edema, + peripheral pulses  Skin: No Rashes, Hematoma, Ecchymosis  Incisions:   Tubes: HPI:  60 year old Male former smoker(2 packs since age of 17, quit 4 years ago) with past medical history of COPD not on home oxygen presents with worsening Shortness of Breath for one week.     Patient reports that since 27th he has been having intermittent fevers associated with dry cough, rigors, chills, loss of taste and smell. He tested positive on the 28th. For last one week he reports progressive shortness of breath, dry cough and increased inspiratory effort associated with sharp non radiating chest pain with deep inspiration. Patient also reports diarrhea for last 5 days watery, non bloody. He saw his PCP Dr. Pollard who referred him to ED. Patient denies any abdominal pain, hypoxia, numbness, tingling, dysuria or any other complaints.    In ED patient afebrile, hemodynamically stable, tachypneic to 30s placed on Bipap saturating 100% on 12/6/40%. CTA Chest negative for PE. In ED patient given one dose of Versed given to reduce the rate with improvement, Solumedrol 125, Magnesium and nebulizer treatments. At time of interview patient also anxious about prognosis and remains tachypneic. (08 Jan 2021 22:00)    REVIEW OF SYSTEMS:  CONSTITUTIONAL: No weakness, fevers or chills  EYES/ENT: No visual changes;  No vertigo or throat pain   NECK: No pain or stiffness  RESPIRATORY: No cough, wheezing, hemoptysis; No shortness of breath  CARDIOVASCULAR: No chest pain or palpitations  GASTROINTESTINAL: No abdominal or epigastric pain. No nausea, vomiting, or hematemesis; No diarrhea or constipation. No melena or hematochezia.  GENITOURINARY: No dysuria, frequency or hematuria  NEUROLOGICAL: No numbness or weakness  SKIN: No itching, rashes    Physical Exam:  General: WN/WD NAD  Neurology: A&Ox3, nonfocal, follows commands  Eyes: PERRLA/ EOMI  ENT/Neck: Neck supple, trachea midline, No JVD  Respiratory: CTA B/L, No wheezing, rales, rhonchi  CV: Normal rate regular rhythm, S1S2, no murmurs, rubs or gallops  Abdominal: Soft, NT, ND +BS,   Extremities: No edema, + peripheral pulses  Skin: No Rashes, Hematoma, Ecchymosis  Incisions: n/a  Tubes: n/a     A/p  Acute resp failure w/ hypoxia 2/2 COVID -19 pneumonia   -Admit to floor / isolation   -IV Abx   -Dexamethasone  -c/w BiPAP and wean off O2 as tolerated   -check inflammatory markers  -ID eval     H/o COPD and hypoxic  - c/w inhalers for now  -pulse ox monitoring     DVT prophylaxis

## 2021-01-08 NOTE — ED PROVIDER NOTE - PROGRESS NOTE DETAILS
Pt signed out to Dr. Owusu pending work up and dispo. DC: Patient initially in urgent care. Patient's condition worsened, patient more tachypneic, resp distress, increased work of breathing, EKG nonischemic, CXR without pneumothorax, wheezing on exam bilaterally; patient moved to crit for BIPAP. Duonebs and mag being given for COPD exacerbation. DC: Patient remains tachypneic however work of breathing improved. Patient will be taken to CT shortly on nonrebreather and then run by the ICU. ED Attending ANNA Owusu  pt moved to crit, covid (+) on 12/28, hz of copd, presented for sob with chest pressure, ekg no laurie or depressions, received albuterol pump, wheezing on exam, saturation high 90's but with increased WOB, placed on bipap, nebz, mg, solumedrol, 1 liter of fluids given, xray reviewed, pending cta chest, pt aware of plan, will continue to monitor and reassess. ED Attending ANNA Owusu  pt moved to crit, covid (+) on 12/28, hx of copd, presented for sob with chest pressure, ekg no laurie or depressions, trop negative, received albuterol pump, wheezing on exam, saturation high 90's but with increased WOB, placed on bipap, nebz, mg, solumedrol, 1 liter of fluids given, xray reviewed, pending cta chest, pt aware of plan, will continue to monitor and reassess. DC: Patient off BIPAP. More comfortable after versed. not a candidate for the ICU as per Dr. Sotelo. DC: Patient off BIPAP. More comfortable after versed. not a candidate for the ICU as per Dr. Barajas.

## 2021-01-08 NOTE — H&P ADULT - NSHPPHYSICALEXAM_GEN_ALL_CORE
GENERAL: NAD, lying in bed comfortably  HEAD:  Atraumatic, Normocephalic  CHEST/LUNG: Clear to auscultation bilaterally, tachypneic  HEART: Regular rate and rhythm; No murmurs, rubs, or gallops  ABDOMEN: Bowel sounds present; Soft, Nontender, Nondistended.  EXTREMITIES:  2+ Peripheral Pulses, brisk capillary refill. No clubbing, cyanosis, or edema  NERVOUS SYSTEM: No Peripheral edema  MSK: FROM all 4 extremities, full and equal strength  SKIN: No rashes or lesions GENERAL: NAD, lying in bed comfortably  HEAD:  Atraumatic, Normocephalic  CHEST/LUNG: Clear to auscultation bilaterally, tachypneic  HEART: Regular rate and rhythm; No murmurs, rubs, or gallops  ABDOMEN: Bowel sounds present; Soft, Nontender, Nondistended.  EXTREMITIES: No Peripheral edema  NERVOUS SYSTEM: No Peripheral edema  MSK: FROM all 4 extremities, full and equal strength  SKIN: No rashes or lesions

## 2021-01-08 NOTE — ED PROVIDER NOTE - ATTENDING CONTRIBUTION TO CARE
60 y.o. M, PMH of COPD, diagnosed with COVID on 12/28, comes in with CP/SOB for 1 wk, getting worse, especially on exertion. Pt denies LE swelling/pain, hx of PE. Denies hx of cardiac disease. States has COPD but only uses pump as needed. On exam, pt in NAD, AAOx3, head NC/AT, CN II-XII intact, lungs CTA B/L, CV S1S2 regular, abdomen soft/NT/ND/(+)BS, ext (-) edema, motor 5/5x4, sensation intact. Will do labs, CXR, CT to r/o PE. Pt appears tachypneic. Will transfer to Crit for BIPAP.

## 2021-01-08 NOTE — H&P ADULT - ASSESSMENT
60 year old Male former smoker(2 packs since age of 17, quit 4 years ago) with past medical history of COPD not on home oxygen presents with worsening Shortness of Breath for one week.     Patient has shortness of breath secondary to COVID exacerbated by anxiety of prognosis. Patient counselled extensively and will give Morphine to reduce the breathing effort and help him breathe better.    # Shortness of Breath 2/2 COVID Pneumonia  - Febrile at home, afebrile in ED, tachypneic to 30s in ED  - Leukopenia present, D-dimer 369 COVID +ve  - CT shows bilateral ground-glass opacities  - s/p Solumedrol 125 ED  - Now on 12/6/40% saturating 100%  - use Bipap overnight  - Will continue with Dexamethasone 6 daily  - Out of window for plasma or Remdesivir  - Wean off Bipap when tachypnea better.   - Target Sat 92-96%  - Follow up Procalcitonin, CRP and Ferritin    # H/O COPD  - Will give Duoneb and Spiriva  - No wheezing on exam unlikely to be an exacerbation    DVT: Lovenox  Diet: DASH  GI: Famotidine  Dispo: Pending improvement in symptoms

## 2021-01-08 NOTE — ED ADULT NURSE NOTE - CHIEF COMPLAINT QUOTE
shortness of breath x 1 week, patient tested positive covid 1 week ago. Headache/diarrhea/body aches/ chest pressure. On assessment patient diaphoretic/ tachypneic. Pulse ox 100% on room air.

## 2021-01-08 NOTE — ED PROVIDER NOTE - PHYSICAL EXAMINATION
CONSTITUTIONAL: Well-developed; well-nourished; in no acute distress.   SKIN: warm, dry.  HEAD: Normocephalic; atraumatic.  EYES: PERRL, EOMI, no conjunctival erythema.  ENT: No nasal discharge; airway clear.  NECK: Supple; non tender.  CARD: S1, S2 normal; no murmurs, gallops, or rubs. Regular rate and rhythm.   RESP: tachypneic, increased work of breathing; normal breath sounds bilaterally. 02 sat 98% with ambulation.   ABD: soft ntnd  EXT: Normal ROM.  No clubbing, cyanosis or edema.   NEURO: Alert, oriented, grossly unremarkable.  PSYCH: Cooperative, appropriate.

## 2021-01-08 NOTE — ED PROVIDER NOTE - OBJECTIVE STATEMENT
Patient is a 59 yo M w/ hx of COPD p/w SOB. Patient has had COVID since Dec 28th, has had chills, cough, chest pain, SOB x 1 week; chest pain worse with inspiration, dull burn, non-radiating, constant. Patient denies hypoxia. Patient denies LE swelling/pain, hx of PE. Denies hx of cardiac disease. Instructed to come to ED by Dr. Pollard his PCP.

## 2021-01-08 NOTE — H&P ADULT - HISTORY OF PRESENT ILLNESS
60 year old Male former smoker(2 packs since age of 17, quit 4 years ago) with past medical history of COPD not on home oxygen presents with worsening Shortness of Breath for one week.     Patient reports that since 27th he has been having intermittent fevers associated with dry cough, rigors, chills, loss of taste and smell. He tested positive on the 28th. For last one week he reports progressive shortness of breath, dry cough and increased inspiratory effort associated with sharp non radiating chest pain with deep inspiration. Patient also reports diarrhea for last 5 days watery, non bloody. He saw his PCP Dr. Pollard who referred him to ED. Patient denies any abdominal pain, hypoxia, numbness, tingling, dysuria or any other complaints.    In ED patient afebrile, hemodynamically stable, tachypneic to 30s placed on Bipap saturating 100% on 12/6/40%. CTA Chest negative for PE. In ED patient given one dose of Versed given to reduce the rate with improvement, Solumedrol 125, Magnesium and nebulizer treatments. At time of interview patient also anxious about prognosis and remains tachypneic.

## 2021-01-09 LAB
A1C WITH ESTIMATED AVERAGE GLUCOSE RESULT: 5.9 % — HIGH (ref 4–5.6)
ALBUMIN SERPL ELPH-MCNC: 3.8 G/DL — SIGNIFICANT CHANGE UP (ref 3.5–5.2)
ALP SERPL-CCNC: 109 U/L — SIGNIFICANT CHANGE UP (ref 30–115)
ALT FLD-CCNC: 28 U/L — SIGNIFICANT CHANGE UP (ref 0–41)
ANION GAP SERPL CALC-SCNC: 10 MMOL/L — SIGNIFICANT CHANGE UP (ref 7–14)
AST SERPL-CCNC: 22 U/L — SIGNIFICANT CHANGE UP (ref 0–41)
BASOPHILS # BLD AUTO: 0.01 K/UL — SIGNIFICANT CHANGE UP (ref 0–0.2)
BASOPHILS NFR BLD AUTO: 0.2 % — SIGNIFICANT CHANGE UP (ref 0–1)
BILIRUB SERPL-MCNC: 0.5 MG/DL — SIGNIFICANT CHANGE UP (ref 0.2–1.2)
BUN SERPL-MCNC: 15 MG/DL — SIGNIFICANT CHANGE UP (ref 10–20)
CALCIUM SERPL-MCNC: 9.1 MG/DL — SIGNIFICANT CHANGE UP (ref 8.5–10.1)
CHLORIDE SERPL-SCNC: 103 MMOL/L — SIGNIFICANT CHANGE UP (ref 98–110)
CHOLEST SERPL-MCNC: 170 MG/DL — SIGNIFICANT CHANGE UP
CO2 SERPL-SCNC: 25 MMOL/L — SIGNIFICANT CHANGE UP (ref 17–32)
CREAT SERPL-MCNC: 0.8 MG/DL — SIGNIFICANT CHANGE UP (ref 0.7–1.5)
CRP SERPL-MCNC: 4.64 MG/DL — HIGH (ref 0–0.4)
EOSINOPHIL # BLD AUTO: 0 K/UL — SIGNIFICANT CHANGE UP (ref 0–0.7)
EOSINOPHIL NFR BLD AUTO: 0 % — SIGNIFICANT CHANGE UP (ref 0–8)
ESTIMATED AVERAGE GLUCOSE: 123 MG/DL — HIGH (ref 68–114)
FERRITIN SERPL-MCNC: 365 NG/ML — SIGNIFICANT CHANGE UP (ref 30–400)
GAS PNL BLDA: SIGNIFICANT CHANGE UP
GLUCOSE BLDC GLUCOMTR-MCNC: 127 MG/DL — HIGH (ref 70–99)
GLUCOSE SERPL-MCNC: 142 MG/DL — HIGH (ref 70–99)
HCT VFR BLD CALC: 42.4 % — SIGNIFICANT CHANGE UP (ref 42–52)
HCV AB S/CO SERPL IA: 0.03 COI — SIGNIFICANT CHANGE UP
HCV AB SERPL-IMP: SIGNIFICANT CHANGE UP
HDLC SERPL-MCNC: 44 MG/DL — SIGNIFICANT CHANGE UP
HGB BLD-MCNC: 14 G/DL — SIGNIFICANT CHANGE UP (ref 14–18)
IMM GRANULOCYTES NFR BLD AUTO: 0.7 % — HIGH (ref 0.1–0.3)
LACTATE SERPL-SCNC: 2.1 MMOL/L — HIGH (ref 0.7–2)
LIPID PNL WITH DIRECT LDL SERPL: 114 MG/DL — HIGH
LYMPHOCYTES # BLD AUTO: 0.82 K/UL — LOW (ref 1.2–3.4)
LYMPHOCYTES # BLD AUTO: 15.2 % — LOW (ref 20.5–51.1)
MAGNESIUM SERPL-MCNC: 2.2 MG/DL — SIGNIFICANT CHANGE UP (ref 1.8–2.4)
MCHC RBC-ENTMCNC: 28.5 PG — SIGNIFICANT CHANGE UP (ref 27–31)
MCHC RBC-ENTMCNC: 33 G/DL — SIGNIFICANT CHANGE UP (ref 32–37)
MCV RBC AUTO: 86.2 FL — SIGNIFICANT CHANGE UP (ref 80–94)
MONOCYTES # BLD AUTO: 0.21 K/UL — SIGNIFICANT CHANGE UP (ref 0.1–0.6)
MONOCYTES NFR BLD AUTO: 3.9 % — SIGNIFICANT CHANGE UP (ref 1.7–9.3)
NEUTROPHILS # BLD AUTO: 4.33 K/UL — SIGNIFICANT CHANGE UP (ref 1.4–6.5)
NEUTROPHILS NFR BLD AUTO: 80 % — HIGH (ref 42.2–75.2)
NON HDL CHOLESTEROL: 126 MG/DL — SIGNIFICANT CHANGE UP
NRBC # BLD: 0 /100 WBCS — SIGNIFICANT CHANGE UP (ref 0–0)
NT-PROBNP SERPL-SCNC: 105 PG/ML — SIGNIFICANT CHANGE UP (ref 0–300)
PLATELET # BLD AUTO: 272 K/UL — SIGNIFICANT CHANGE UP (ref 130–400)
POTASSIUM SERPL-MCNC: 4.8 MMOL/L — SIGNIFICANT CHANGE UP (ref 3.5–5)
POTASSIUM SERPL-SCNC: 4.8 MMOL/L — SIGNIFICANT CHANGE UP (ref 3.5–5)
PROCALCITONIN SERPL-MCNC: 0.07 NG/ML — SIGNIFICANT CHANGE UP (ref 0.02–0.1)
PROT SERPL-MCNC: 7.3 G/DL — SIGNIFICANT CHANGE UP (ref 6–8)
RBC # BLD: 4.92 M/UL — SIGNIFICANT CHANGE UP (ref 4.7–6.1)
RBC # FLD: 13.1 % — SIGNIFICANT CHANGE UP (ref 11.5–14.5)
SODIUM SERPL-SCNC: 138 MMOL/L — SIGNIFICANT CHANGE UP (ref 135–146)
TRIGL SERPL-MCNC: 85 MG/DL — SIGNIFICANT CHANGE UP
TROPONIN T SERPL-MCNC: <0.01 NG/ML — SIGNIFICANT CHANGE UP
WBC # BLD: 5.41 K/UL — SIGNIFICANT CHANGE UP (ref 4.8–10.8)
WBC # FLD AUTO: 5.41 K/UL — SIGNIFICANT CHANGE UP (ref 4.8–10.8)

## 2021-01-09 PROCEDURE — 93970 EXTREMITY STUDY: CPT | Mod: 26

## 2021-01-09 PROCEDURE — 71045 X-RAY EXAM CHEST 1 VIEW: CPT | Mod: 26

## 2021-01-09 RX ORDER — PANTOPRAZOLE SODIUM 20 MG/1
40 TABLET, DELAYED RELEASE ORAL DAILY
Refills: 0 | Status: DISCONTINUED | OUTPATIENT
Start: 2021-01-09 | End: 2021-01-11

## 2021-01-09 RX ORDER — MEROPENEM 1 G/30ML
1000 INJECTION INTRAVENOUS EVERY 8 HOURS
Refills: 0 | Status: DISCONTINUED | OUTPATIENT
Start: 2021-01-09 | End: 2021-01-09

## 2021-01-09 RX ORDER — FUROSEMIDE 40 MG
40 TABLET ORAL EVERY 12 HOURS
Refills: 0 | Status: COMPLETED | OUTPATIENT
Start: 2021-01-09 | End: 2021-01-10

## 2021-01-09 RX ORDER — MIDAZOLAM HYDROCHLORIDE 1 MG/ML
2 INJECTION, SOLUTION INTRAMUSCULAR; INTRAVENOUS ONCE
Refills: 0 | Status: DISCONTINUED | OUTPATIENT
Start: 2021-01-09 | End: 2021-01-09

## 2021-01-09 RX ORDER — ENOXAPARIN SODIUM 100 MG/ML
40 INJECTION SUBCUTANEOUS EVERY 12 HOURS
Refills: 0 | Status: DISCONTINUED | OUTPATIENT
Start: 2021-01-09 | End: 2021-01-22

## 2021-01-09 RX ORDER — DEXAMETHASONE 0.5 MG/5ML
6 ELIXIR ORAL DAILY
Refills: 0 | Status: DISCONTINUED | OUTPATIENT
Start: 2021-01-09 | End: 2021-01-21

## 2021-01-09 RX ORDER — VANCOMYCIN HCL 1 G
1500 VIAL (EA) INTRAVENOUS ONCE
Refills: 0 | Status: COMPLETED | OUTPATIENT
Start: 2021-01-09 | End: 2021-01-09

## 2021-01-09 RX ORDER — INFLUENZA VIRUS VACCINE 15; 15; 15; 15 UG/.5ML; UG/.5ML; UG/.5ML; UG/.5ML
0.5 SUSPENSION INTRAMUSCULAR ONCE
Refills: 0 | Status: DISCONTINUED | OUTPATIENT
Start: 2021-01-09 | End: 2021-01-22

## 2021-01-09 RX ORDER — MORPHINE SULFATE 50 MG/1
2 CAPSULE, EXTENDED RELEASE ORAL ONCE
Refills: 0 | Status: DISCONTINUED | OUTPATIENT
Start: 2021-01-09 | End: 2021-01-09

## 2021-01-09 RX ORDER — FUROSEMIDE 40 MG
40 TABLET ORAL DAILY
Refills: 0 | Status: DISCONTINUED | OUTPATIENT
Start: 2021-01-09 | End: 2021-01-09

## 2021-01-09 RX ORDER — MORPHINE SULFATE 50 MG/1
4 CAPSULE, EXTENDED RELEASE ORAL EVERY 4 HOURS
Refills: 0 | Status: DISCONTINUED | OUTPATIENT
Start: 2021-01-09 | End: 2021-01-16

## 2021-01-09 RX ADMIN — Medication 1 MILLIGRAM(S): at 09:02

## 2021-01-09 RX ADMIN — Medication 300 MILLIGRAM(S): at 12:07

## 2021-01-09 RX ADMIN — Medication 125 MILLIGRAM(S): at 11:07

## 2021-01-09 RX ADMIN — PANTOPRAZOLE SODIUM 40 MILLIGRAM(S): 20 TABLET, DELAYED RELEASE ORAL at 12:07

## 2021-01-09 RX ADMIN — CHLORHEXIDINE GLUCONATE 1 APPLICATION(S): 213 SOLUTION TOPICAL at 04:38

## 2021-01-09 RX ADMIN — ENOXAPARIN SODIUM 40 MILLIGRAM(S): 100 INJECTION SUBCUTANEOUS at 17:29

## 2021-01-09 RX ADMIN — MORPHINE SULFATE 4 MILLIGRAM(S): 50 CAPSULE, EXTENDED RELEASE ORAL at 17:30

## 2021-01-09 RX ADMIN — Medication 6 MILLIGRAM(S): at 04:40

## 2021-01-09 RX ADMIN — MEROPENEM 100 MILLIGRAM(S): 1 INJECTION INTRAVENOUS at 13:15

## 2021-01-09 RX ADMIN — MORPHINE SULFATE 4 MILLIGRAM(S): 50 CAPSULE, EXTENDED RELEASE ORAL at 20:13

## 2021-01-09 RX ADMIN — MORPHINE SULFATE 4 MILLIGRAM(S): 50 CAPSULE, EXTENDED RELEASE ORAL at 12:25

## 2021-01-09 RX ADMIN — MORPHINE SULFATE 2 MILLIGRAM(S): 50 CAPSULE, EXTENDED RELEASE ORAL at 08:45

## 2021-01-09 RX ADMIN — MEROPENEM 100 MILLIGRAM(S): 1 INJECTION INTRAVENOUS at 20:17

## 2021-01-09 RX ADMIN — Medication 6 MILLIGRAM(S): at 11:06

## 2021-01-09 RX ADMIN — Medication 40 MILLIGRAM(S): at 17:30

## 2021-01-09 NOTE — CHART NOTE - NSCHARTNOTEFT_GEN_A_CORE
RR/TRANSFER NOTE:    Was notified by RN during rounds that patient was tachypneic and desaturating to the 80s. Patient was proned; FIO2 increased to 100% on BIPAP. Patient was given 2mg of morphine IV push, total 3mg ativan, which helped in improving respiratory rate, and 125mg IV solumedrol. Otherwise, bp and hr was stable. ABG was done which showed ph 7.54 pco2 of 27 and po2 of 556, lactate was 1.9. Anesthesia was called but intubation was held off d/t improvement is tachypnea. Patient evaluated by critical care team and patient to be upgraded to ICU for further management. CXR was done - f/u read.    Vitals:  T(C): 35.8 (09 Jan 2021 08:00), Max: 36.8 (08 Jan 2021 15:39)  T(F): 96.4 (09 Jan 2021 08:00), Max: 98.3 (08 Jan 2021 15:39)  HR: 64 (09 Jan 2021 08:00) (64 - 92)  BP: 134/72 (09 Jan 2021 08:00) (128/81 - 139/85)  BP(mean): 105 (08 Jan 2021 21:02) (105 - 105)  RR: 18 (09 Jan 2021 08:00) (17 - 30)  SpO2: 99% (09 Jan 2021 08:00) (96% - 100%)    #Acute hypoxemic resp failure likely 2/2 COVID PNA - cannot r/o bacterial superinfection  # h/o COPD not on home O2  - cont w dexa 6mg daily  - cont w ativan prn for anxiety and tachypnea  - started on meropenem for empiric bacterial coverage  - f/u inflammatory markers  - f/u CXR read and AM routine labs  - f/u ID recs  - likely low threshold for intubation (if intubated - point of contact is brother Chon)    *case discussed by pulm fellow and attending*  **sign out given to ICU senior Dr. Trevizo [id number]** RR/TRANSFER NOTE:    Was notified by RN during rounds that patient was tachypneic and desaturating to the 80s. Patient was proned; FIO2 increased to 100% on BIPAP. Patient was given 2mg IV morphine push, total of 3mg ativan (which helped in improving respiratory rate) and 125mg IV solumedrol. Otherwise, BP and HR was stable. CXR done (read pending); ABG showed ph 7.54, pco2 27, po2 556, lactate 1.9. Anesthesia called but intubation was held off d/t improvement in tachypnea. Patient evaluated by critical care team and patient approved for upgraded to ICU for further management.    Vitals:  T(C): 35.8 (09 Jan 2021 08:00), Max: 36.8 (08 Jan 2021 15:39)  T(F): 96.4 (09 Jan 2021 08:00), Max: 98.3 (08 Jan 2021 15:39)  HR: 64 (09 Jan 2021 08:00) (64 - 92)  BP: 134/72 (09 Jan 2021 08:00) (128/81 - 139/85)  BP(mean): 105 (08 Jan 2021 21:02) (105 - 105)  RR: 18 (09 Jan 2021 08:00) (17 - 30)  SpO2: 99% (09 Jan 2021 08:00) (96% - 100%)    #Acute hypoxemic resp failure likely 2/2 COVID PNA - cannot r/o bacterial superinfection  # h/o COPD not on home O2  - cont w dexa 6mg daily  - cont w ativan prn for anxiety and tachypnea  - started on meropenem for empiric bacterial coverage  - f/u inflammatory markers  - f/u CXR read and AM routine labs  - f/u ID recs  - likely low threshold for intubation (if intubated - point of contact is brother Chon)    *case discussed by pulm fellow and attending*  **sign out given to ICU senior Dr. Trevizo [id number]** RR/TRANSFER NOTE:    Was notified by RN during rounds that patient was tachypneic and desaturating to the 80s. Patient was proned; FIO2 increased to 100% on BIPAP. Patient was given 2mg IV morphine push, total of 3mg ativan (which helped in improving respiratory rate) and 125mg IV solumedrol. Otherwise, BP and HR was stable. CXR done (read pending); ABG showed ph 7.54, pco2 27, po2 556, lactate 1.9. Anesthesia called but intubation was held off d/t improvement in tachypnea. Patient evaluated by critical care team and patient approved for upgraded to ICU for further management.    Vitals:  T(C): 35.8 (09 Jan 2021 08:00), Max: 36.8 (08 Jan 2021 15:39)  T(F): 96.4 (09 Jan 2021 08:00), Max: 98.3 (08 Jan 2021 15:39)  HR: 64 (09 Jan 2021 08:00) (64 - 92)  BP: 134/72 (09 Jan 2021 08:00) (128/81 - 139/85)  BP(mean): 105 (08 Jan 2021 21:02) (105 - 105)  RR: 18 (09 Jan 2021 08:00) (17 - 30)  SpO2: 99% (09 Jan 2021 08:00) (96% - 100%)    #Acute hypoxemic resp failure likely 2/2 COVID PNA - cannot r/o bacterial superinfection  # h/o COPD not on home O2  - cont w dexa 6mg daily  - cont w ativan prn for anxiety and tachypnea  - started on meropenem and vancomycin for empiric bacterial coverage  - f/u inflammatory markers and blood cx  - f/u CXR read and AM routine labs  - f/u LE duplex  - f/u ID recs  - likely low threshold for intubation (if intubated - point of contact is brother Chon)    *case discussed by pulm fellow and attending*  **sign out given to ICU senior Dr. Trevizo [id number]** RR/TRANSFER NOTE:    Transfer from: 4B- medical floor    Transfer to: ICU    Reason for transfer: Acute hypoxemic respiratory failure    HPI:  60 year old Male former smoker(2 packs since age of 17, quit 4 years ago) with past medical history of COPD not on home oxygen presents with worsening Shortness of Breath for one week.   Patient reports that since 27th he has been having intermittent fevers associated with dry cough, rigors, chills, loss of taste and smell. He tested positive on the 28th. For last one week he reports progressive shortness of breath, dry cough and increased inspiratory effort associated with sharp non radiating chest pain with deep inspiration. Patient also reports diarrhea for last 5 days watery, non bloody. He saw his PCP Dr. Pollard who referred him to ED. Patient denies any abdominal pain, hypoxia, numbness, tingling, dysuria or any other complaints.  In ED patient afebrile, hemodynamically stable, tachypneic to 30s placed on Bipap saturating 100% on 12/6/40%. CTA Chest negative for PE. In ED patient given one dose of Versed given to reduce the rate with improvement, Solumedrol 125, Magnesium and nebulizer treatments. At time of interview patient also anxious about prognosis and remains tachypneic. (08 Jan 2021 22:00)    4B course:  Was notified by RN during rounds that patient was tachypneic and desaturating to the 80s. Patient was proned; FIO2 increased to 100% on BIPAP. Patient was given 2mg IV morphine push, total of 3mg ativan (which helped in improving respiratory rate) and 125mg IV solumedrol. Otherwise, BP and HR was stable. CXR done (read pending); ABG showed ph 7.54, pco2 27, po2 556, lactate 1.9. Anesthesia called but intubation was held off d/t improvement in tachypnea. Patient evaluated by critical care team and patient approved for upgraded to ICU for further management.    Vitals:  T(C): 35.8 (09 Jan 2021 08:00), Max: 36.8 (08 Jan 2021 15:39)  T(F): 96.4 (09 Jan 2021 08:00), Max: 98.3 (08 Jan 2021 15:39)  HR: 64 (09 Jan 2021 08:00) (64 - 92)  BP: 134/72 (09 Jan 2021 08:00) (128/81 - 139/85)  BP(mean): 105 (08 Jan 2021 21:02) (105 - 105)  RR: 18 (09 Jan 2021 08:00) (17 - 30)  SpO2: 99% (09 Jan 2021 08:00) (96% - 100%)    P/E: Not wheezing on exam; anxious with increased work fo breathing; dec BS b/l    #Acute hypoxemic resp failure likely 2/2 COVID PNA - cannot r/o bacterial superinfection  # h/o COPD not on home O2  - cont w dexa 6mg daily  - cont w ativan prn for anxiety and tachypnea  - started on meropenem and vancomycin for empiric bacterial coverage  - f/u inflammatory markers and blood cx  - f/u CXR read and AM routine labs  - f/u LE duplex  - f/u ID recs  - likely low threshold for intubation (if intubated - point of contact is brother Chon)    FOLLOW UP  [ ] CXR read  [ ] blood cx, inflam markers, AM labs and CXR   [ ] Duplex LE b/l   [ ] ID follow up     *case discussed by pulm fellow and attending*  **sign out given to ICU senior Dr. Trevizo [id number]** RR/TRANSFER NOTE:    Transfer from: 4B- medical floor    Transfer to: ICU    Reason for transfer: Acute hypoxemic respiratory failure    HPI:  60 year old Male former smoker(2 packs since age of 17, quit 4 years ago) with past medical history of COPD not on home oxygen presents with worsening Shortness of Breath for one week.   Patient reports that since 27th he has been having intermittent fevers associated with dry cough, rigors, chills, loss of taste and smell. He tested positive on the 28th. For last one week he reports progressive shortness of breath, dry cough and increased inspiratory effort associated with sharp non radiating chest pain with deep inspiration. Patient also reports diarrhea for last 5 days watery, non bloody. He saw his PCP Dr. Pollard who referred him to ED. Patient denies any abdominal pain, hypoxia, numbness, tingling, dysuria or any other complaints.  In ED patient afebrile, hemodynamically stable, tachypneic to 30s placed on Bipap saturating 100% on 12/6/40%. CTA Chest negative for PE. In ED patient given one dose of Versed given to reduce the rate with improvement, Solumedrol 125, Magnesium and nebulizer treatments. At time of interview patient also anxious about prognosis and remains tachypneic. (08 Jan 2021 22:00)    4B course:  Was notified by RN during rounds that patient was tachypneic and desaturating to the 80s. Patient was proned; FIO2 increased to 100% on BIPAP. Patient was given 2mg IV morphine push, total of 3mg ativan (which helped in improving respiratory rate) and 125mg IV solumedrol. Otherwise, BP and HR was stable. CXR done (read pending); ABG showed ph 7.54, pco2 27, po2 556, lactate 1.9. Anesthesia called but intubation was held off d/t improvement in tachypnea. Patient evaluated by critical care team and patient approved for upgraded to ICU for further management.    Vitals:  T(C): 35.8 (09 Jan 2021 08:00), Max: 36.8 (08 Jan 2021 15:39)  T(F): 96.4 (09 Jan 2021 08:00), Max: 98.3 (08 Jan 2021 15:39)  HR: 64 (09 Jan 2021 08:00) (64 - 92)  BP: 134/72 (09 Jan 2021 08:00) (128/81 - 139/85)  BP(mean): 105 (08 Jan 2021 21:02) (105 - 105)  RR: 18 (09 Jan 2021 08:00) (17 - 30)  SpO2: 99% (09 Jan 2021 08:00) (96% - 100%)    P/E: Not wheezing on exam; anxious with increased work fo breathing; dec BS b/l    #Acute hypoxemic resp failure likely 2/2 COVID PNA - cannot r/o bacterial superinfection  # h/o COPD not on home O2  - cont w dexa 6mg daily  - cont w ativan prn for anxiety and tachypnea  - started on meropenem and vancomycin for empiric bacterial coverage  - f/u inflammatory markers, mrsa pcr and blood cx  - f/u CXR read and AM routine labs  - f/u LE duplex  - f/u ID recs  - likely low threshold for intubation (if intubated - point of contact is brother Chon)    FOLLOW UP  [ ] CXR read  [ ] blood cx, inflam markers, AM labs and CXR   [ ] Duplex LE b/l   [ ] MRSA pcr  [ ] ID follow up     *case discussed by pulm fellow and attending*  **sign out given to ICU senior Dr. Trevizo [id number]**

## 2021-01-09 NOTE — PROGRESS NOTE ADULT - SUBJECTIVE AND OBJECTIVE BOX
SUBJECTIVE:    Patient is a 60y old Male who presents with a chief complaint of Shortness of Breath (08 Jan 2021 22:00)    Currently admitted to medicine with the primary diagnosis of COVID-19       24 hour update:  Today is hospital day 1d. This morning he is resting comfortably in bed and reports no new issues or overnight events.      PAST MEDICAL & SURGICAL HISTORY  No pertinent past medical history      SOCIAL HISTORY:    ALLERGIES:  No Known Allergies    MEDICATIONS:  STANDING MEDICATIONS  ALBUTerol    90 MICROgram(s) HFA Inhaler 2 Puff(s) Inhalation every 15 minutes  albuterol/ipratropium for Nebulization 3 milliLiter(s) Nebulizer every 6 hours  chlorhexidine 4% Liquid 1 Application(s) Topical <User Schedule>  famotidine    Tablet 20 milliGRAM(s) Oral daily  influenza   Vaccine 0.5 milliLiter(s) IntraMuscular once  LORazepam   Injectable 1 milliGRAM(s) IV Push once  methylPREDNISolone sodium succinate Injectable 125 milliGRAM(s) IV Push once  methylPREDNISolone sodium succinate Injectable 60 milliGRAM(s) IV Push every 12 hours  midazolam Injectable 2 milliGRAM(s) IV Push once  morphine  - Injectable 2 milliGRAM(s) IV Push once  tiotropium 18 MICROgram(s) Capsule 1 Capsule(s) Inhalation daily    PRN MEDICATIONS  LORazepam   Injectable 1 milliGRAM(s) IV Push every 8 hours PRN  morphine  - Injectable 2 milliGRAM(s) IV Push every 6 hours PRN    VITALS:   T(F): 96.4  HR: 64  BP: 134/72  RR: 18  SpO2: 99%      LABS:                        14.0   5.41  )-----------( 272      ( 09 Jan 2021 06:16 )             42.4     01-09    138  |  103  |  15  ----------------------------<  142<H>  4.8   |  25  |  0.8    Ca    9.1      09 Jan 2021 06:16  Mg     2.2     01-09    TPro  7.3  /  Alb  3.8  /  TBili  0.5  /  DBili  x   /  AST  22  /  ALT  28  /  AlkPhos  109  01-09    ABG - ( 09 Jan 2021 09:27 )  pH, Arterial: 7.54  pH, Blood: x     /  pCO2: 27    /  pO2: 556   / HCO3: 23    / Base Excess: 2.2   /  SaO2: 100       Troponin T, Serum: <0.01 ng/mL (01-08-21 @ 16:50)    Serum Pro-Brain Natriuretic Peptide: 19 pg/mL (01-08-21 @ 16:50)      IMAGING:       SUBJECTIVE:    Patient is a 60y old Male who presents with a chief complaint of Shortness of Breath (08 Jan 2021 22:00)    Currently admitted to medicine with the primary diagnosis of COVID-19    24 hour update:  Today is hospital day 1d. Patient was seen this AM on BIPAP.      PAST MEDICAL & SURGICAL HISTORY  No pertinent past medical history      SOCIAL HISTORY:    ALLERGIES:  No Known Allergies    MEDICATIONS:  STANDING MEDICATIONS  ALBUTerol    90 MICROgram(s) HFA Inhaler 2 Puff(s) Inhalation every 15 minutes  albuterol/ipratropium for Nebulization 3 milliLiter(s) Nebulizer every 6 hours  chlorhexidine 4% Liquid 1 Application(s) Topical <User Schedule>  famotidine    Tablet 20 milliGRAM(s) Oral daily  influenza   Vaccine 0.5 milliLiter(s) IntraMuscular once  LORazepam   Injectable 1 milliGRAM(s) IV Push once  methylPREDNISolone sodium succinate Injectable 125 milliGRAM(s) IV Push once  methylPREDNISolone sodium succinate Injectable 60 milliGRAM(s) IV Push every 12 hours  midazolam Injectable 2 milliGRAM(s) IV Push once  morphine  - Injectable 2 milliGRAM(s) IV Push once  tiotropium 18 MICROgram(s) Capsule 1 Capsule(s) Inhalation daily    PRN MEDICATIONS  LORazepam   Injectable 1 milliGRAM(s) IV Push every 8 hours PRN  morphine  - Injectable 2 milliGRAM(s) IV Push every 6 hours PRN    VITALS:   T(F): 96.4  HR: 64  BP: 134/72  RR: 18  SpO2: 99%      LABS:                        14.0   5.41  )-----------( 272      ( 09 Jan 2021 06:16 )             42.4     01-09    138  |  103  |  15  ----------------------------<  142<H>  4.8   |  25  |  0.8    Ca    9.1      09 Jan 2021 06:16  Mg     2.2     01-09    TPro  7.3  /  Alb  3.8  /  TBili  0.5  /  DBili  x   /  AST  22  /  ALT  28  /  AlkPhos  109  01-09    ABG - ( 09 Jan 2021 09:27 )  pH, Arterial: 7.54  pH, Blood: x     /  pCO2: 27    /  pO2: 556   / HCO3: 23    / Base Excess: 2.2   /  SaO2: 100       Troponin T, Serum: <0.01 ng/mL (01-08-21 @ 16:50)    Serum Pro-Brain Natriuretic Peptide: 19 pg/mL (01-08-21 @ 16:50)    IMAGING:  CT Angio Chest PE Protocol w/ IV Cont (01.08.21 @ 18:38)  IMPRESSION:  1.  No evidence of pulmonary embolism.  2.  Bilateral faint groundglass opacities right lung greater than left lung could reflect an infectious/inflammatory process including Covid pneumonia.  3.  Mild to moderate emphysematous changes throughout the lungs.         SUBJECTIVE:    Patient is a 60y old Male who presents with a chief complaint of Shortness of Breath (08 Jan 2021 22:00)    Currently admitted to medicine with the primary diagnosis of COVID-19    24 hour update:  Today is hospital day 1d. Patient was seen this AM on BIPAP.      PAST MEDICAL & SURGICAL HISTORY  No pertinent past medical history      SOCIAL HISTORY:    ALLERGIES:  No Known Allergies    MEDICATIONS:  STANDING MEDICATIONS  ALBUTerol    90 MICROgram(s) HFA Inhaler 2 Puff(s) Inhalation every 15 minutes  albuterol/ipratropium for Nebulization 3 milliLiter(s) Nebulizer every 6 hours  chlorhexidine 4% Liquid 1 Application(s) Topical <User Schedule>  famotidine    Tablet 20 milliGRAM(s) Oral daily  influenza   Vaccine 0.5 milliLiter(s) IntraMuscular once  LORazepam   Injectable 1 milliGRAM(s) IV Push once  methylPREDNISolone sodium succinate Injectable 125 milliGRAM(s) IV Push once  methylPREDNISolone sodium succinate Injectable 60 milliGRAM(s) IV Push every 12 hours  midazolam Injectable 2 milliGRAM(s) IV Push once  morphine  - Injectable 2 milliGRAM(s) IV Push once  tiotropium 18 MICROgram(s) Capsule 1 Capsule(s) Inhalation daily    PRN MEDICATIONS  LORazepam   Injectable 1 milliGRAM(s) IV Push every 8 hours PRN  morphine  - Injectable 2 milliGRAM(s) IV Push every 6 hours PRN    VITALS:   T(F): 96.4  HR: 64  BP: 134/72  RR: 18  SpO2: 99%    Physical Exam:   GENERAL: Patient tachypneic in respiratory distress on BIPAP    LABS:                        14.0   5.41  )-----------( 272      ( 09 Jan 2021 06:16 )             42.4     01-09    138  |  103  |  15  ----------------------------<  142<H>  4.8   |  25  |  0.8    Ca    9.1      09 Jan 2021 06:16  Mg     2.2     01-09    TPro  7.3  /  Alb  3.8  /  TBili  0.5  /  DBili  x   /  AST  22  /  ALT  28  /  AlkPhos  109  01-09    ABG - ( 09 Jan 2021 09:27 )  pH, Arterial: 7.54  pH, Blood: x     /  pCO2: 27    /  pO2: 556   / HCO3: 23    / Base Excess: 2.2   /  SaO2: 100       Troponin T, Serum: <0.01 ng/mL (01-08-21 @ 16:50)    Serum Pro-Brain Natriuretic Peptide: 19 pg/mL (01-08-21 @ 16:50)    IMAGING:  CT Angio Chest PE Protocol w/ IV Cont (01.08.21 @ 18:38)  IMPRESSION:  1.  No evidence of pulmonary embolism.  2.  Bilateral faint groundglass opacities right lung greater than left lung could reflect an infectious/inflammatory process including Covid pneumonia.  3.  Mild to moderate emphysematous changes throughout the lungs.

## 2021-01-09 NOTE — CHART NOTE - NSCHARTNOTEFT_GEN_A_CORE
rapid response called for resp distress-- HR was 60s and resp rate going up to 63/min. Patient was desaturating intermittently but was able to maintain tidal volume.   Patient was given morphine 2mg and ativan 1mg x2 calm him down  blood gas ph 7.54  pco2 is 27   decision made not to intubate him currently and was sent to ICU.

## 2021-01-09 NOTE — CONSULT NOTE ADULT - ASSESSMENT
HPI:  60 year old Male former smoker(2 packs since age of 17, quit 4 years ago) with past medical history of COPD not on home oxygen presents with worsening Shortness of Breath for one week.     Patient reports that since 27th he has been having intermittent fevers associated with dry cough, rigors, chills, loss of taste and smell. He tested positive on the 28th. For last one week he reports progressive shortness of breath, dry cough and increased inspiratory effort associated with sharp non radiating chest pain with deep inspiration. Patient also reports diarrhea for last 5 days watery, non bloody.    IMPRESSION;  COVID 19 with critical illness. MV or end organ damage as seen in sepsis/septic shock. ARDS is the most common form of organ dysfunction.  Steroids beneficial.  Pt is in the late inflammatory response phase ot the illness based on the onset of symptoms.  procalcitonin 0.07  , not suggestive of a bacterial PNA.  Ferritin 365  CRP 4.6  Ddimers 369  CT chest : no PE, few opacities    RECOMMENDATIONS;  BCx  Dexamethasone 6 mg iv q24h for 10 days   Monitor for side effects: hyperglycemia, neurological ( agitation/confusion), adrenal suppression, bacterial and fungal infections  d/c meropenem HPI:  60 year old Male former smoker(2 packs since age of 17, quit 4 years ago) with past medical history of COPD not on home oxygen presents with worsening Shortness of Breath for one week.     Patient reports that since 27th he has been having intermittent fevers associated with dry cough, rigors, chills, loss of taste and smell. He tested positive on the 28th. For last one week he reports progressive shortness of breath, dry cough and increased inspiratory effort associated with sharp non radiating chest pain with deep inspiration. Patient also reports diarrhea for last 5 days watery, non bloody.    IMPRESSION;  COVID 19 with critical illness. BIPAP  Steroids beneficial.  Pt is in the late inflammatory response phase ot the illness based on the onset of symptoms.  procalcitonin 0.07  , not suggestive of a bacterial PNA.  Ferritin 365  CRP 4.6  Ddimers 369  CT chest : no PE, few opacities    RECOMMENDATIONS;  BCx  Dexamethasone 6 mg iv q24h for 10 days   Monitor for side effects: hyperglycemia, neurological ( agitation/confusion), adrenal suppression, bacterial and fungal infections  d/c meropenem

## 2021-01-09 NOTE — CONSULT NOTE ADULT - ASSESSMENT
IMPRESSION:  Acute Hypoxemic Respiratory failure, on NIV  COVID PNA      PLAN:    CNS: Avoid CNS depressants.    HEENT: Oral care    PULMONARY:  HOB @ 45 degrees.  Aspiration precautions.  c/w NIV, try to alternate with HFNC.  Low threshold for intubation.  CT chest negative for PE    CARDIOVASCULAR:  Lasix 40mg qd.  Keep I < O.  Avoid volume overload.  BNP.  ECHO.    GI: GI prophylaxis.  NPO for now.  Bowel regimen.    RENAL:  Follow up lytes.  Correct as needed.  Monitor UO.    INFECTIOUS DISEASE:   Cefepime.   Panculture.  Procalcitonin.  MRSA nares.  Follow up cultures    HEMATOLOGICAL:  D-dimer.  DVT prophylaxis.    ENDOCRINE:  Follow up FS.  Insulin protocol if needed.    MUSCULOSKELETAL: bed rest    Transfer to MICU  Guarded prognosis IMPRESSION:  Acute Hypoxemic Respiratory failure, on NIV  COVID PNA      PLAN:    CNS: Avoid CNS depressants.    HEENT: Oral care    PULMONARY:  HOB @ 45 degrees.  Aspiration precautions.  c/w NIV, try to alternate with HFNC.  Nebz q4-6h.  Low threshold for intubation.  CT chest negative for PE    CARDIOVASCULAR:  Lasix 40mg qd.  Keep I < O.  Avoid volume overload.  BNP.  ECHO.    GI: GI prophylaxis.  NPO for now.  Bowel regimen.    RENAL:  Follow up lytes.  Correct as needed.  Monitor UO.    INFECTIOUS DISEASE:   Cefepime.   Panculture.  Procalcitonin.  MRSA nares.  Follow up cultures    HEMATOLOGICAL:  D-dimer.  DVT prophylaxis.    ENDOCRINE:  Follow up FS.  Insulin protocol if needed.    MUSCULOSKELETAL: bed rest    Transfer to MICU  Guarded prognosis

## 2021-01-09 NOTE — PROVIDER CONTACT NOTE (OTHER) - ASSESSMENT
Bladder scanned 3x results: >577 >600 >515. Patient reports to have stopped taking Flomax few years ago.

## 2021-01-09 NOTE — PROGRESS NOTE ADULT - ASSESSMENT
60 year old Male former smoker(2 packs since age of 17, quit 4 years ago) with past medical history of COPD not on home oxygen presents with worsening Shortness of Breath for one week.     Patient has shortness of breath secondary to COVID exacerbated by anxiety of prognosis. Patient counselled extensively and will give Morphine to reduce the breathing effort and help him breathe better.    # Shortness of Breath 2/2 COVID Pneumonia  - Febrile at home, afebrile in ED, tachypneic to 30s in ED  - Leukopenia present, D-dimer 369 COVID +ve  - CT shows bilateral ground-glass opacities  - s/p Solumedrol 125 ED  - Now on 12/6/40% saturating 100%  - use Bipap overnight  - Will continue with Dexamethasone 6 daily  - Out of window for plasma or Remdesivir  - Wean off Bipap when tachypnea better.   - Target Sat 92-96%  - Follow up Procalcitonin, CRP and Ferritin    # H/O COPD  - Will give Duoneb and Spiriva  - No wheezing on exam unlikely to be an exacerbation    DVT: Lovenox  Diet: DASH  GI: Famotidine  Dispo: Pending improvement in symptoms   60 year old Male former smoker(2 packs since age of 17, quit 4 years ago) with past medical history of COPD not on home oxygen presents with worsening Shortness of Breath for one week. Patient has shortness of breath secondary to COVID exacerbated by anxiety of prognosis. Patient counselled extensively and given morphine to reduce the breathing effort and help him breathe better.    # Shortness of Breath 2/2 COVID Pneumonia  - Febrile at home, afebrile in ED, tachypneic to 30s in ED  - Leukopenia present, D-dimer 369 COVID +ve  - CT shows bilateral ground-glass opacities  - s/p Solumedrol 125 ED  - Now on 12/6/40% saturating 100%  - use Bipap overnight  - started on Dexa; Out of window for plasma or Remdesivir  - Wean off Bipap when tachypnea better  - Target Sat 92-96%  - Follow up Procalcitonin, CRP and Ferritin    # H/O COPD  - Will give Duoneb and Spiriva  - No wheezing on exam unlikely to be an exacerbation    DVT: Lovenox  Diet: DASH  GI: Famotidine    ** RR called - see note**

## 2021-01-09 NOTE — CONSULT NOTE ADULT - SUBJECTIVE AND OBJECTIVE BOX
Patient is a 60y old  Male who presents with a chief complaint of Shortness of Breath (09 Jan 2021 09:40)      HPI:  60 year old Male former smoker(2 packs since age of 17, quit 4 years ago) with past medical history of COPD not on home oxygen presents with worsening Shortness of Breath for one week.     Patient reports that since 27th he has been having intermittent fevers associated with dry cough, rigors, chills, loss of taste and smell. He tested positive on the 28th. For last one week he reports progressive shortness of breath, dry cough and increased inspiratory effort associated with sharp non radiating chest pain with deep inspiration. Patient also reports diarrhea for last 5 days watery, non bloody. He saw his PCP Dr. Pollard who referred him to ED. Patient denies any abdominal pain, hypoxia, numbness, tingling, dysuria or any other complaints.    In ED patient afebrile, hemodynamically stable, tachypneic to 30s placed on Bipap saturating 100% on 12/6/40%. CTA Chest negative for PE. In ED patient given one dose of Versed given to reduce the rate with improvement, Solumedrol 125, Magnesium and nebulizer treatments. At time of interview patient also anxious about prognosis and remains tachypneic. (08 Jan 2021 22:00)    PAST MEDICAL & SURGICAL HISTORY:  COPD      SOCIAL HX:   62py smoker    FAMILY HISTORY:  :  No known cardiovacular family hisotry     Review Of Systems:   All ROS are negative except per HPI     Allergies  No Known Allergies  Intolerances      PHYSICAL EXAM  ICU Vital Signs Last 24 Hrs  T(C): 35.8 (09 Jan 2021 08:00), Max: 36.8 (08 Jan 2021 15:39)  T(F): 96.4 (09 Jan 2021 08:00), Max: 98.3 (08 Jan 2021 15:39)  HR: 64 (09 Jan 2021 08:00) (64 - 92)  BP: 134/72 (09 Jan 2021 08:00) (128/81 - 139/85)  BP(mean): 105 (08 Jan 2021 21:02) (105 - 105)  RR: 18 (09 Jan 2021 08:00) (17 - 30)  SpO2: 99% (09 Jan 2021 08:00) (96% - 100%)      CONSTITUTIONAL:  Well nourished.  Mild-moderate tachypnea    ENT:   Airway patent,   Mouth with normal mucosa.   No thrush    EYES:   pupils equal,   round and reactive to light.    CARDIAC:   Tachycardic  Regular rhythm.    Heart sounds S1, S2.   No edema    Vascular:   normal systolic impulse  no bruits    RESPIRATORY:   B/l crackles  No wheezing   Normal chest expansion  No use of accessory muscles    GASTROINTESTINAL:  Abdomen soft   Non-tender,   No guarding,   + BS    GENITOURINARY  normal genitalia for sex  no edema    MUSCULOSKELETAL:   Range of motion is not limited,  No clubbing, cyanosis    NEUROLOGICAL:   Alert and oriented   No motor or sensory deficits.  Pertinent DTRs normal    SKIN:   Skin normal color for race,   Warm and dry  No evidence of rash.    PSYCHIATRIC:   Normal mood and affect.   No apparent risk to self or others.    HEME LYMPH:   No cervical  lymphadenopathy.  No inguinal lymphadenopathy        LABS:                          14.0   5.41  )-----------( 272      ( 09 Jan 2021 06:16 )             42.4     138  |  103  |  15  ----------------------------<  142<H>  4.8   |  25  |  0.8    Ca    9.1      09 Jan 2021 06:16  Mg     2.2     01-09    TPro  7.3  /  Alb  3.8  /  TBili  0.5  /  DBili  x   /  AST  22  /  ALT  28  /  AlkPhos  109  01-09    CARDIAC MARKERS ( 08 Jan 2021 16:50 )  x     / <0.01 ng/mL / x     / x     / x        LIVER FUNCTIONS - ( 09 Jan 2021 06:16 )  Alb: 3.8 g/dL / Pro: 7.3 g/dL / ALK PHOS: 109 U/L / ALT: 28 U/L / AST: 22 U/L / GGT: x                                                ABG - ( 09 Jan 2021 09:27 )  pH, Arterial: 7.54  pH, Blood: x     /  pCO2: 27    /  pO2: 556   / HCO3: 23    / Base Excess: 2.2   /  SaO2: 100         X-Rays reviewed  CXR interpreted by me: peripheral patchy infiltrates    MEDICATIONS  (STANDING):  ALBUTerol    90 MICROgram(s) HFA Inhaler 2 Puff(s) Inhalation every 15 minutes  albuterol/ipratropium for Nebulization 3 milliLiter(s) Nebulizer every 6 hours  chlorhexidine 4% Liquid 1 Application(s) Topical <User Schedule>  dexAMETHasone  Injectable 6 milliGRAM(s) IV Push daily  enoxaparin Injectable 40 milliGRAM(s) SubCutaneous every 12 hours  famotidine    Tablet 20 milliGRAM(s) Oral daily  influenza   Vaccine 0.5 milliLiter(s) IntraMuscular once  LORazepam   Injectable 1 milliGRAM(s) IV Push once  meropenem  IVPB 1000 milliGRAM(s) IV Intermittent every 8 hours  methylPREDNISolone sodium succinate Injectable 125 milliGRAM(s) IV Push once  midazolam Injectable 2 milliGRAM(s) IV Push once  tiotropium 18 MICROgram(s) Capsule 1 Capsule(s) Inhalation daily  vancomycin  IVPB 1500 milliGRAM(s) IV Intermittent once    MEDICATIONS  (PRN):  LORazepam   Injectable 1 milliGRAM(s) IV Push every 8 hours PRN Anxiety  morphine  - Injectable 2 milliGRAM(s) IV Push every 6 hours PRN Tachypnea        
  HALI MIMS  60y, Male  Allergy: No Known Allergies      All historical available data reviewed.    HPI:  60 year old Male former smoker(2 packs since age of 17, quit 4 years ago) with past medical history of COPD not on home oxygen presents with worsening Shortness of Breath for one week.     Patient reports that since 27th he has been having intermittent fevers associated with dry cough, rigors, chills, loss of taste and smell. He tested positive on the 28th. For last one week he reports progressive shortness of breath, dry cough and increased inspiratory effort associated with sharp non radiating chest pain with deep inspiration. Patient also reports diarrhea for last 5 days watery, non bloody. He saw his PCP Dr. Pollard who referred him to ED. Patient denies any abdominal pain, hypoxia, numbness, tingling, dysuria or any other complaints.    In ED patient afebrile, hemodynamically stable, tachypneic to 30s placed on Bipap saturating 100% on 12/6/40%. CTA Chest negative for PE. In ED patient given one dose of Versed given to reduce the rate with improvement, Solumedrol 125, Magnesium and nebulizer treatments. At time of interview patient also anxious about prognosis and remains tachypneic. (08 Jan 2021 22:00)  Intubated 1/9    FAMILY HISTORY:    PAST MEDICAL & SURGICAL HISTORY:  No pertinent past medical history          VITALS:  T(F): 98.2, Max: 98.2 (01-09-21 @ 16:00)  HR: 62  BP: 116/75  RR: 16Vital Signs Last 24 Hrs  T(C): 36.8 (09 Jan 2021 16:00), Max: 36.8 (09 Jan 2021 16:00)  T(F): 98.2 (09 Jan 2021 16:00), Max: 98.2 (09 Jan 2021 16:00)  HR: 62 (09 Jan 2021 18:43) (62 - 78)  BP: 116/75 (09 Jan 2021 18:00) (110/72 - 147/99)  BP(mean): 88 (09 Jan 2021 18:00) (82 - 119)  RR: 16 (09 Jan 2021 18:00) (16 - 32)  SpO2: 100% (09 Jan 2021 18:43) (98% - 100%)    TESTS & MEASUREMENTS:                        14.0   5.41  )-----------( 272      ( 09 Jan 2021 06:16 )             42.4     01-09    138  |  103  |  15  ----------------------------<  142<H>  4.8   |  25  |  0.8    Ca    9.1      09 Jan 2021 06:16  Mg     2.2     01-09    TPro  7.3  /  Alb  3.8  /  TBili  0.5  /  DBili  x   /  AST  22  /  ALT  28  /  AlkPhos  109  01-09    LIVER FUNCTIONS - ( 09 Jan 2021 06:16 )  Alb: 3.8 g/dL / Pro: 7.3 g/dL / ALK PHOS: 109 U/L / ALT: 28 U/L / AST: 22 U/L / GGT: x                   RADIOLOGY & ADDITIONAL TESTS:  Personal review of radiological diagnostics performed  Echo and EKG results noted when applicable.     MEDICATIONS:  ALBUTerol    90 MICROgram(s) HFA Inhaler 2 Puff(s) Inhalation every 15 minutes  albuterol/ipratropium for Nebulization 3 milliLiter(s) Nebulizer every 6 hours  chlorhexidine 4% Liquid 1 Application(s) Topical <User Schedule>  dexAMETHasone  Injectable 6 milliGRAM(s) IV Push daily  enoxaparin Injectable 40 milliGRAM(s) SubCutaneous every 12 hours  furosemide   Injectable 40 milliGRAM(s) IV Push every 12 hours  influenza   Vaccine 0.5 milliLiter(s) IntraMuscular once  LORazepam   Injectable 2 milliGRAM(s) IV Push every 8 hours PRN  LORazepam   Injectable 1 milliGRAM(s) IV Push once  meropenem  IVPB 1000 milliGRAM(s) IV Intermittent every 8 hours  morphine  - Injectable 4 milliGRAM(s) IV Push every 4 hours PRN  pantoprazole  Injectable 40 milliGRAM(s) IV Push daily  tiotropium 18 MICROgram(s) Capsule 1 Capsule(s) Inhalation daily      ANTIBIOTICS:  meropenem  IVPB 1000 milliGRAM(s) IV Intermittent every 8 hours

## 2021-01-09 NOTE — PROVIDER CONTACT NOTE (OTHER) - SITUATION
Patient complains of difficulty urinating and painful pressure on pubis. Unable to stand up and dyspneic on exertion.

## 2021-01-10 LAB
ALBUMIN SERPL ELPH-MCNC: 3.9 G/DL — SIGNIFICANT CHANGE UP (ref 3.5–5.2)
ALP SERPL-CCNC: 101 U/L — SIGNIFICANT CHANGE UP (ref 30–115)
ALT FLD-CCNC: 25 U/L — SIGNIFICANT CHANGE UP (ref 0–41)
ANION GAP SERPL CALC-SCNC: 14 MMOL/L — SIGNIFICANT CHANGE UP (ref 7–14)
AST SERPL-CCNC: 18 U/L — SIGNIFICANT CHANGE UP (ref 0–41)
BASOPHILS # BLD AUTO: 0.01 K/UL — SIGNIFICANT CHANGE UP (ref 0–0.2)
BASOPHILS NFR BLD AUTO: 0.1 % — SIGNIFICANT CHANGE UP (ref 0–1)
BILIRUB SERPL-MCNC: 0.3 MG/DL — SIGNIFICANT CHANGE UP (ref 0.2–1.2)
BLD GP AB SCN SERPL QL: SIGNIFICANT CHANGE UP
BUN SERPL-MCNC: 23 MG/DL — HIGH (ref 10–20)
CALCIUM SERPL-MCNC: 9 MG/DL — SIGNIFICANT CHANGE UP (ref 8.5–10.1)
CHLORIDE SERPL-SCNC: 104 MMOL/L — SIGNIFICANT CHANGE UP (ref 98–110)
CO2 SERPL-SCNC: 23 MMOL/L — SIGNIFICANT CHANGE UP (ref 17–32)
CREAT SERPL-MCNC: 0.8 MG/DL — SIGNIFICANT CHANGE UP (ref 0.7–1.5)
D DIMER BLD IA.RAPID-MCNC: 248 NG/ML DDU — HIGH (ref 0–230)
EOSINOPHIL # BLD AUTO: 0 K/UL — SIGNIFICANT CHANGE UP (ref 0–0.7)
EOSINOPHIL NFR BLD AUTO: 0 % — SIGNIFICANT CHANGE UP (ref 0–8)
GLUCOSE SERPL-MCNC: 141 MG/DL — HIGH (ref 70–99)
HCT VFR BLD CALC: 43 % — SIGNIFICANT CHANGE UP (ref 42–52)
HGB BLD-MCNC: 14.2 G/DL — SIGNIFICANT CHANGE UP (ref 14–18)
IMM GRANULOCYTES NFR BLD AUTO: 0.6 % — HIGH (ref 0.1–0.3)
LYMPHOCYTES # BLD AUTO: 1.17 K/UL — LOW (ref 1.2–3.4)
LYMPHOCYTES # BLD AUTO: 11.5 % — LOW (ref 20.5–51.1)
MAGNESIUM SERPL-MCNC: 2.5 MG/DL — HIGH (ref 1.8–2.4)
MCHC RBC-ENTMCNC: 28.2 PG — SIGNIFICANT CHANGE UP (ref 27–31)
MCHC RBC-ENTMCNC: 33 G/DL — SIGNIFICANT CHANGE UP (ref 32–37)
MCV RBC AUTO: 85.3 FL — SIGNIFICANT CHANGE UP (ref 80–94)
MONOCYTES # BLD AUTO: 0.76 K/UL — HIGH (ref 0.1–0.6)
MONOCYTES NFR BLD AUTO: 7.5 % — SIGNIFICANT CHANGE UP (ref 1.7–9.3)
MRSA PCR RESULT.: SIGNIFICANT CHANGE UP
NEUTROPHILS # BLD AUTO: 8.15 K/UL — HIGH (ref 1.4–6.5)
NEUTROPHILS NFR BLD AUTO: 80.3 % — HIGH (ref 42.2–75.2)
NRBC # BLD: 0 /100 WBCS — SIGNIFICANT CHANGE UP (ref 0–0)
PLATELET # BLD AUTO: 324 K/UL — SIGNIFICANT CHANGE UP (ref 130–400)
POTASSIUM SERPL-MCNC: 4.4 MMOL/L — SIGNIFICANT CHANGE UP (ref 3.5–5)
POTASSIUM SERPL-SCNC: 4.4 MMOL/L — SIGNIFICANT CHANGE UP (ref 3.5–5)
PROT SERPL-MCNC: 6.7 G/DL — SIGNIFICANT CHANGE UP (ref 6–8)
RBC # BLD: 5.04 M/UL — SIGNIFICANT CHANGE UP (ref 4.7–6.1)
RBC # FLD: 13.1 % — SIGNIFICANT CHANGE UP (ref 11.5–14.5)
S AUREUS DNA NOSE QL NAA+PROBE: SIGNIFICANT CHANGE UP
SODIUM SERPL-SCNC: 141 MMOL/L — SIGNIFICANT CHANGE UP (ref 135–146)
WBC # BLD: 10.15 K/UL — SIGNIFICANT CHANGE UP (ref 4.8–10.8)
WBC # FLD AUTO: 10.15 K/UL — SIGNIFICANT CHANGE UP (ref 4.8–10.8)

## 2021-01-10 PROCEDURE — 71045 X-RAY EXAM CHEST 1 VIEW: CPT | Mod: 26

## 2021-01-10 PROCEDURE — 99233 SBSQ HOSP IP/OBS HIGH 50: CPT

## 2021-01-10 RX ORDER — BUDESONIDE AND FORMOTEROL FUMARATE DIHYDRATE 160; 4.5 UG/1; UG/1
2 AEROSOL RESPIRATORY (INHALATION)
Refills: 0 | Status: DISCONTINUED | OUTPATIENT
Start: 2021-01-10 | End: 2021-01-22

## 2021-01-10 RX ORDER — TAMSULOSIN HYDROCHLORIDE 0.4 MG/1
0.4 CAPSULE ORAL AT BEDTIME
Refills: 0 | Status: DISCONTINUED | OUTPATIENT
Start: 2021-01-10 | End: 2021-01-22

## 2021-01-10 RX ADMIN — Medication 6 MILLIGRAM(S): at 04:25

## 2021-01-10 RX ADMIN — ENOXAPARIN SODIUM 40 MILLIGRAM(S): 100 INJECTION SUBCUTANEOUS at 17:19

## 2021-01-10 RX ADMIN — Medication 3 MILLILITER(S): at 15:25

## 2021-01-10 RX ADMIN — Medication 40 MILLIGRAM(S): at 04:26

## 2021-01-10 RX ADMIN — ENOXAPARIN SODIUM 40 MILLIGRAM(S): 100 INJECTION SUBCUTANEOUS at 04:26

## 2021-01-10 RX ADMIN — CHLORHEXIDINE GLUCONATE 1 APPLICATION(S): 213 SOLUTION TOPICAL at 04:26

## 2021-01-10 RX ADMIN — TAMSULOSIN HYDROCHLORIDE 0.4 MILLIGRAM(S): 0.4 CAPSULE ORAL at 22:40

## 2021-01-10 RX ADMIN — Medication 2 MILLIGRAM(S): at 15:23

## 2021-01-10 RX ADMIN — PANTOPRAZOLE SODIUM 40 MILLIGRAM(S): 20 TABLET, DELAYED RELEASE ORAL at 06:59

## 2021-01-10 NOTE — PROGRESS NOTE ADULT - ASSESSMENT
IMPRESSION:  Acute Hypoxemic Respiratory failure, on NIV  COVID PNA  copd     PLAN:    CNS: Avoid CNS depressants.    HEENT: Oral care    PULMONARY:  HOB @ 45 degrees.  Aspiration precautions.  c/w NIV, try to alternate with HFNC.  Nebz q4-6h.  Low threshold for intubation.  CT chest negative for PE  start symbicort Q 12 hrs   CARDIOVASCULAR:  Lasix 40mg qd.  Keep I < O.  Avoid volume overload.  BNP.  ECHO.    GI: GI prophylaxis.  NPO for now.  Bowel regimen.    RENAL:  Follow up lytes.  Correct as needed.  Monitor UO.  resume flomax   INFECTIOUS DISEASE:   Cefepime.   Panculture.  Procalcitonin.  MRSA nares.  Follow up cultures    HEMATOLOGICAL:  D-dimer.  DVT prophylaxis.    ENDOCRINE:  Follow up FS.  Insulin protocol if needed.    MUSCULOSKELETAL: bed rest  OOb to chair   Guarded prognosis

## 2021-01-10 NOTE — PROGRESS NOTE ADULT - SUBJECTIVE AND OBJECTIVE BOX
Patient is a 60y old  Male who presents with a chief complaint of Shortness of Breath (09 Jan 2021 19:28)      Over Night Events:  Patient seen and examined.   ob bipap 60 10/6     ROS:  See HPI    PHYSICAL EXAM    ICU Vital Signs Last 24 Hrs  T(C): 36.9 (10 Hari 2021 08:00), Max: 36.9 (10 Hari 2021 08:00)  T(F): 98.4 (10 Hari 2021 08:00), Max: 98.4 (10 Hari 2021 08:00)  HR: 64 (10 Hari 2021 08:00) (54 - 78)  BP: 131/86 (10 Hari 2021 08:00) (91/57 - 147/99)  BP(mean): 103 (10 Hari 2021 08:00) (68 - 119)  ABP: --  ABP(mean): --  RR: 33 (10 Hari 2021 08:00) (9 - 33)  SpO2: 100% (10 Hari 2021 08:00) (99% - 100%)      General: awake   HEENT:  [place]              Lymph Nodes: NO cervical LN   Lungs: Bilateral crackles   Cardiovascular: Regular   Abdomen: Soft, Positive BS  Extremities: No clubbing   Skin: warm   Neurological: no focal   Musculoskeletal: move all ext     I&O's Detail    09 Jan 2021 07:01  -  10 Hari 2021 07:00  --------------------------------------------------------  IN:    Oral Fluid: 35 mL  Total IN: 35 mL    OUT:    Intermittent Catheterization - Urethral (mL): 2000 mL    Voided (mL): 1500 mL  Total OUT: 3500 mL    Total NET: -3465 mL          LABS:                          14.2   10.15 )-----------( 324      ( 10 Hari 2021 04:30 )             43.0         10 Hari 2021 04:30    141    |  104    |  23     ----------------------------<  141    4.4     |  23     |  0.8      Ca    9.0        10 Hari 2021 04:30  Mg     2.5       10 Hari 2021 04:30    TPro  6.7    /  Alb  3.9    /  TBili  0.3    /  DBili  x      /  AST  18     /  ALT  25     /  AlkPhos  101    10 Hari 2021 04:30  Amylase x     lipase x                                                                                            Lactate, Blood: 2.1 mmol/L (01-09-21 @ 18:22)    CARDIAC MARKERS ( 09 Jan 2021 18:22 )  x     / <0.01 ng/mL / x     / x     / x      CARDIAC MARKERS ( 08 Jan 2021 16:50 )  x     / <0.01 ng/mL / x     / x     / x                                                                                                                                             ABG - ( 09 Jan 2021 09:27 )  pH, Arterial: 7.54  pH, Blood: x     /  pCO2: 27    /  pO2: 556   / HCO3: 23    / Base Excess: 2.2   /  SaO2: 100                 MEDICATIONS  (STANDING):  ALBUTerol    90 MICROgram(s) HFA Inhaler 2 Puff(s) Inhalation every 15 minutes  albuterol/ipratropium for Nebulization 3 milliLiter(s) Nebulizer every 6 hours  chlorhexidine 4% Liquid 1 Application(s) Topical <User Schedule>  dexAMETHasone  Injectable 6 milliGRAM(s) IV Push daily  enoxaparin Injectable 40 milliGRAM(s) SubCutaneous every 12 hours  influenza   Vaccine 0.5 milliLiter(s) IntraMuscular once  LORazepam   Injectable 1 milliGRAM(s) IV Push once  pantoprazole  Injectable 40 milliGRAM(s) IV Push daily  tiotropium 18 MICROgram(s) Capsule 1 Capsule(s) Inhalation daily    MEDICATIONS  (PRN):  LORazepam   Injectable 2 milliGRAM(s) IV Push every 8 hours PRN Anxiety  morphine  - Injectable 4 milliGRAM(s) IV Push every 4 hours PRN tachypnea.          Xrays:  TLC:  OG:  ET tube:                                                                                    mild b/l oapcity    ECHO:  [facility] ICU:

## 2021-01-11 ENCOUNTER — TRANSCRIPTION ENCOUNTER (OUTPATIENT)
Age: 61
End: 2021-01-11

## 2021-01-11 LAB
ALBUMIN SERPL ELPH-MCNC: 3.7 G/DL — SIGNIFICANT CHANGE UP (ref 3.5–5.2)
ALP SERPL-CCNC: 91 U/L — SIGNIFICANT CHANGE UP (ref 30–115)
ALT FLD-CCNC: 25 U/L — SIGNIFICANT CHANGE UP (ref 0–41)
ANION GAP SERPL CALC-SCNC: 11 MMOL/L — SIGNIFICANT CHANGE UP (ref 7–14)
AST SERPL-CCNC: 16 U/L — SIGNIFICANT CHANGE UP (ref 0–41)
BASOPHILS # BLD AUTO: 0.01 K/UL — SIGNIFICANT CHANGE UP (ref 0–0.2)
BASOPHILS NFR BLD AUTO: 0.1 % — SIGNIFICANT CHANGE UP (ref 0–1)
BILIRUB SERPL-MCNC: 0.3 MG/DL — SIGNIFICANT CHANGE UP (ref 0.2–1.2)
BUN SERPL-MCNC: 28 MG/DL — HIGH (ref 10–20)
CALCIUM SERPL-MCNC: 9.2 MG/DL — SIGNIFICANT CHANGE UP (ref 8.5–10.1)
CHLORIDE SERPL-SCNC: 103 MMOL/L — SIGNIFICANT CHANGE UP (ref 98–110)
CO2 SERPL-SCNC: 26 MMOL/L — SIGNIFICANT CHANGE UP (ref 17–32)
CREAT SERPL-MCNC: 0.8 MG/DL — SIGNIFICANT CHANGE UP (ref 0.7–1.5)
CRP SERPL-MCNC: 1.95 MG/DL — HIGH (ref 0–0.4)
D DIMER BLD IA.RAPID-MCNC: 260 NG/ML DDU — HIGH (ref 0–230)
EOSINOPHIL # BLD AUTO: 0.01 K/UL — SIGNIFICANT CHANGE UP (ref 0–0.7)
EOSINOPHIL NFR BLD AUTO: 0.1 % — SIGNIFICANT CHANGE UP (ref 0–8)
GAS PNL BLDA: SIGNIFICANT CHANGE UP
GLUCOSE SERPL-MCNC: 103 MG/DL — HIGH (ref 70–99)
HCT VFR BLD CALC: 41.8 % — LOW (ref 42–52)
HGB BLD-MCNC: 13.8 G/DL — LOW (ref 14–18)
IMM GRANULOCYTES NFR BLD AUTO: 0.5 % — HIGH (ref 0.1–0.3)
LDH SERPL L TO P-CCNC: 173 U/L — SIGNIFICANT CHANGE UP (ref 50–242)
LYMPHOCYTES # BLD AUTO: 1.99 K/UL — SIGNIFICANT CHANGE UP (ref 1.2–3.4)
LYMPHOCYTES # BLD AUTO: 18 % — LOW (ref 20.5–51.1)
MAGNESIUM SERPL-MCNC: 2.5 MG/DL — HIGH (ref 1.8–2.4)
MCHC RBC-ENTMCNC: 28.1 PG — SIGNIFICANT CHANGE UP (ref 27–31)
MCHC RBC-ENTMCNC: 33 G/DL — SIGNIFICANT CHANGE UP (ref 32–37)
MCV RBC AUTO: 85.1 FL — SIGNIFICANT CHANGE UP (ref 80–94)
MONOCYTES # BLD AUTO: 0.87 K/UL — HIGH (ref 0.1–0.6)
MONOCYTES NFR BLD AUTO: 7.9 % — SIGNIFICANT CHANGE UP (ref 1.7–9.3)
NEUTROPHILS # BLD AUTO: 8.1 K/UL — HIGH (ref 1.4–6.5)
NEUTROPHILS NFR BLD AUTO: 73.4 % — SIGNIFICANT CHANGE UP (ref 42.2–75.2)
NRBC # BLD: 0 /100 WBCS — SIGNIFICANT CHANGE UP (ref 0–0)
PLATELET # BLD AUTO: 294 K/UL — SIGNIFICANT CHANGE UP (ref 130–400)
POTASSIUM SERPL-MCNC: 4.4 MMOL/L — SIGNIFICANT CHANGE UP (ref 3.5–5)
POTASSIUM SERPL-SCNC: 4.4 MMOL/L — SIGNIFICANT CHANGE UP (ref 3.5–5)
PROCALCITONIN SERPL-MCNC: 0.04 NG/ML — SIGNIFICANT CHANGE UP (ref 0.02–0.1)
PROT SERPL-MCNC: 6.3 G/DL — SIGNIFICANT CHANGE UP (ref 6–8)
RBC # BLD: 4.91 M/UL — SIGNIFICANT CHANGE UP (ref 4.7–6.1)
RBC # FLD: 12.9 % — SIGNIFICANT CHANGE UP (ref 11.5–14.5)
SODIUM SERPL-SCNC: 140 MMOL/L — SIGNIFICANT CHANGE UP (ref 135–146)
WBC # BLD: 11.03 K/UL — HIGH (ref 4.8–10.8)
WBC # FLD AUTO: 11.03 K/UL — HIGH (ref 4.8–10.8)

## 2021-01-11 RX ORDER — PANTOPRAZOLE SODIUM 20 MG/1
40 TABLET, DELAYED RELEASE ORAL
Refills: 0 | Status: DISCONTINUED | OUTPATIENT
Start: 2021-01-11 | End: 2021-01-22

## 2021-01-11 RX ADMIN — Medication 6 MILLIGRAM(S): at 05:39

## 2021-01-11 RX ADMIN — BUDESONIDE AND FORMOTEROL FUMARATE DIHYDRATE 2 PUFF(S): 160; 4.5 AEROSOL RESPIRATORY (INHALATION) at 12:27

## 2021-01-11 RX ADMIN — CHLORHEXIDINE GLUCONATE 1 APPLICATION(S): 213 SOLUTION TOPICAL at 05:38

## 2021-01-11 RX ADMIN — Medication 3 MILLILITER(S): at 10:59

## 2021-01-11 RX ADMIN — BUDESONIDE AND FORMOTEROL FUMARATE DIHYDRATE 2 PUFF(S): 160; 4.5 AEROSOL RESPIRATORY (INHALATION) at 21:37

## 2021-01-11 RX ADMIN — ENOXAPARIN SODIUM 40 MILLIGRAM(S): 100 INJECTION SUBCUTANEOUS at 05:39

## 2021-01-11 RX ADMIN — PANTOPRAZOLE SODIUM 40 MILLIGRAM(S): 20 TABLET, DELAYED RELEASE ORAL at 05:39

## 2021-01-11 RX ADMIN — Medication 2 MILLIGRAM(S): at 14:12

## 2021-01-11 RX ADMIN — Medication 2 MILLIGRAM(S): at 22:55

## 2021-01-11 RX ADMIN — TAMSULOSIN HYDROCHLORIDE 0.4 MILLIGRAM(S): 0.4 CAPSULE ORAL at 21:37

## 2021-01-11 RX ADMIN — ENOXAPARIN SODIUM 40 MILLIGRAM(S): 100 INJECTION SUBCUTANEOUS at 18:05

## 2021-01-11 NOTE — PROGRESS NOTE ADULT - SUBJECTIVE AND OBJECTIVE BOX
HALI MIMS 60y Male  MRN#: [id number]   Hospital Day: 3d    SUBJECTIVE  Patient is a 60y old Male who presents with a chief complaint of Shortness of Breath (11 Jan 2021 10:46)  Currently admitted to medicine with the primary diagnosis of COVID-19      INTERVAL HPI AND OVERNIGHT EVENTS:  Patient was examined and seen at bedside. This morning he is resting comfortably in bed and reports no issues or overnight events.    REVIEW OF SYMPTOMS:  +sob and cough. Denies any pain. Endorses eating and drinking. Having BMs and urinating okay.    OBJECTIVE  PAST MEDICAL & SURGICAL HISTORY  No pertinent past medical history      ALLERGIES:  No Known Allergies    MEDICATIONS:  STANDING MEDICATIONS  ALBUTerol    90 MICROgram(s) HFA Inhaler 2 Puff(s) Inhalation every 15 minutes  albuterol/ipratropium for Nebulization 3 milliLiter(s) Nebulizer every 6 hours  budesonide 160 MICROgram(s)/formoterol 4.5 MICROgram(s) Inhaler 2 Puff(s) Inhalation two times a day  chlorhexidine 4% Liquid 1 Application(s) Topical <User Schedule>  dexAMETHasone  Injectable 6 milliGRAM(s) IV Push daily  enoxaparin Injectable 40 milliGRAM(s) SubCutaneous every 12 hours  influenza   Vaccine 0.5 milliLiter(s) IntraMuscular once  LORazepam   Injectable 1 milliGRAM(s) IV Push once  pantoprazole    Tablet 40 milliGRAM(s) Oral before breakfast  tamsulosin 0.4 milliGRAM(s) Oral at bedtime  tiotropium 18 MICROgram(s) Capsule 1 Capsule(s) Inhalation daily    PRN MEDICATIONS  LORazepam   Injectable 2 milliGRAM(s) IV Push every 8 hours PRN  morphine  - Injectable 4 milliGRAM(s) IV Push every 4 hours PRN      VITAL SIGNS: Last 24 Hours  T(C): 36.2 (11 Jan 2021 08:00), Max: 36.8 (10 Hari 2021 12:00)  T(F): 97.1 (11 Jan 2021 08:00), Max: 98.2 (10 Hari 2021 12:00)  HR: 76 (11 Jan 2021 09:00) (56 - 96)  BP: 115/71 (11 Jan 2021 09:00) (96/86 - 135/81)  BP(mean): 84 (11 Jan 2021 09:00) (68 - 99)  RR: 24 (11 Jan 2021 09:00) (9 - 24)  SpO2: 100% (11 Jan 2021 09:00) (99% - 100%)    LABS:                        13.8   11.03 )-----------( 294      ( 11 Jan 2021 04:30 )             41.8     01-11    140  |  103  |  28<H>  ----------------------------<  103<H>  4.4   |  26  |  0.8    Ca    9.2      11 Jan 2021 04:30  Mg     2.5     01-11    TPro  6.3  /  Alb  3.7  /  TBili  0.3  /  DBili  x   /  AST  16  /  ALT  25  /  AlkPhos  91  01-11        ABG - ( 11 Jan 2021 10:35 )  pH, Arterial: 7.48  pH, Blood: x     /  pCO2: 34    /  pO2: 94    / HCO3: 26    / Base Excess: 2.6   /  SaO2: 98                    Culture - Blood (collected 09 Jan 2021 18:20)  Source: .Blood Blood-Peripheral  Preliminary Report (11 Jan 2021 02:29):    No growth to date.      CARDIAC MARKERS ( 09 Jan 2021 18:22 )  x     / <0.01 ng/mL / x     / x     / x          RADIOLOGY:      PHYSICAL EXAM:  CONSTITUTIONAL: No acute distress, well-developed, AAOx3, on HFNC  ENT: no JVD; moist mucous membranes  PULMONARY: Clear to auscultation bilaterally; no wheezes, rales. Pt cannot speak in full sentences.  CARDIOVASCULAR: Regular rate and rhythm; no murmurs  GASTROINTESTINAL: Soft, non-tender, non-distended  MUSCULOSKELETAL: no edema  NEUROLOGY: non-focal  SKIN: No rashes or lesions; warm and dry    ASSESSMENT & PLAN  60 year old Male former smoker(2 packs since age of 17, quit 4 years ago) with past medical history of COPD not on home oxygen presents with worsening Shortness of Breath for one week. Patient has shortness of breath secondary to COVID exacerbated by anxiety of prognosis. Patient counselled extensively and given morphine to reduce the breathing effort and help him breathe better.    # Shortness of Breath 2/2 COVID Pneumonia  # No suspected superimposed bacterial infection  - Febrile at home, afebrile in ED, tachypneic to 30s in ED  - Leukopenia present, D-dimer 369 COVID +ve  - CT shows bilateral ground-glass opacities (No PE)  - LE duplex -ve 1/9  - No ABX per ID  Ferritin: 365  CRP: 4.64  Procal: 0.07  D-dimer: 369>248  - on HFNC 50 LPM/60%  - c/w Dexa (since 1/9)  - c/w HFNC  - Target Sat 92-96%  - Lovenox 40 BID    # H/O COPD  - s/p 1 dose solumedrol  - off Abx (had been on kalen+vanco)  - No wheezing on exam unlikely to be an exacerbation  - c/w Duonebs and spiriva    DVT: Lovenox  Diet: DASH  GI: Protonix      PAST MEDICAL & SURGICAL HISTORY:  No pertinent past medical history

## 2021-01-11 NOTE — CHART NOTE - NSCHARTNOTEFT_GEN_A_CORE
ICU Transfer Note    Transfer from: ICU  ----------------------------------------------------------------------------------------------------------  HPI / ICU COURSE:    HPI:  60 year old Male former smoker(2 packs since age of 17, quit 4 years ago) with past medical history of COPD not on home oxygen presents with worsening Shortness of Breath for one week.     Patient reports that since 27th he has been having intermittent fevers associated with dry cough, rigors, chills, loss of taste and smell. He tested positive on the 28th. For last one week he reports progressive shortness of breath, dry cough and increased inspiratory effort associated with sharp non radiating chest pain with deep inspiration. Patient also reports diarrhea for last 5 days watery, non bloody. He saw his PCP Dr. Pollard who referred him to ED. Patient denies any abdominal pain, hypoxia, numbness, tingling, dysuria or any other complaints.    In ED patient afebrile, hemodynamically stable, tachypneic to 30s placed on Bipap saturating 100% on 12/6/40%. CTA Chest negative for PE. In ED patient given one dose of Versed given to reduce the rate with improvement, Solumedrol 125, Magnesium and nebulizer treatments. At time of interview patient also anxious about prognosis and remains tachypneic. (08 Jan 2021 22:00)    Pt had initially been admitted to medical floor. However, in AM 1/9, pt was tachypneic and desaturating to 80s. There had initially been concern for need to intubate, but pt's respiratory distress and tachypneia resolved with morphine and ativan IVPs. Pt was upgraded to ICU for further monitoring and management.    While in ICU, pt tolerated HFNC and has not required any BiPAP or intubation. Pt seen by ID who recommended no ABX. For covid, pt is being treated with dexamethasone 6mg qd, but no need for RDV or CP at current time. Repeat inflammatory markers were ordered and collected. Pt also placed on duonebs and spiriva for underlying COPD. Finally, pt started on flomax for difficulty urinating (states he has h/o BPH)   --------------------------------------------------------------------------------------------------------  PMH/PSH:  PAST MEDICAL & SURGICAL HISTORY:  No pertinent past medical history    -------------------------------------------------------------------------------------------------------    Vital Signs Last 24 Hrs  T(C): 36.2 (11 Jan 2021 08:00), Max: 36.4 (11 Jan 2021 00:00)  T(F): 97.1 (11 Jan 2021 08:00), Max: 97.6 (11 Jan 2021 00:00)  HR: 76 (11 Jan 2021 09:00) (56 - 96)  BP: 115/71 (11 Jan 2021 09:00) (96/86 - 135/81)  BP(mean): 84 (11 Jan 2021 09:00) (68 - 99)  RR: 24 (11 Jan 2021 09:00) (9 - 24)  SpO2: 100% (11 Jan 2021 09:00) (99% - 100%)    I&O's Summary    10 Hari 2021 07:01  -  11 Jan 2021 07:00  --------------------------------------------------------  IN: 0 mL / OUT: 1200 mL / NET: -1200 mL    11 Jan 2021 07:01  -  11 Jan 2021 13:55  --------------------------------------------------------  IN: 0 mL / OUT: 300 mL / NET: -300 mL    --------------------------------------------------------------------------------------------------------  LABS:   CARDIAC MARKERS ( 09 Jan 2021 18:22 )  x     / <0.01 ng/mL / x     / x     / x                                  13.8   11.03 )-----------( 294      ( 11 Jan 2021 04:30 )             41.8       01-11    140  |  103  |  28<H>  ----------------------------<  103<H>  4.4   |  26  |  0.8    Ca    9.2      11 Jan 2021 04:30  Mg     2.5     01-11    TPro  6.3  /  Alb  3.7  /  TBili  0.3  /  DBili  x   /  AST  16  /  ALT  25  /  AlkPhos  91  01-11          ABG - ( 11 Jan 2021 10:35 )  pH, Arterial: 7.48  pH, Blood: x     /  pCO2: 34    /  pO2: 94    / HCO3: 26    / Base Excess: 2.6   /  SaO2: 98                  CULTURE RESULTS:                01-09-21 @ 18:20  Specimen Source: --  Method Type: --  Gram Stain - RRL: --  Gram Stain - Wound: --  Bacteria: --  Culture Results:   No growth to date.      Specimen Source:   Method Type:   Gram Stain:   Culture Results: Culture Results:   No growth to date. (01-09-21 @ 18:20)    Bacteria:     ---------------------------------------------------------------------------------------------------  ASSESSMENT & PLAN:   60 year old Male former smoker(2 packs since age of 17, quit 4 years ago) with past medical history of COPD not on home oxygen presents with worsening Shortness of Breath for one week. Patient has shortness of breath secondary to COVID exacerbated by anxiety of prognosis. Patient counselled extensively and given morphine to reduce the breathing effort and help him breathe better.    # Shortness of Breath 2/2 COVID Pneumonia  # No suspected superimposed bacterial infection  - Febrile at home, afebrile in ED, tachypneic to 30s in ED  - Leukopenia present, D-dimer 369 COVID +ve  - CT shows bilateral ground-glass opacities (No PE)  - LE duplex -ve 1/9  - No ABX per ID  Ferritin: 365  CRP: 4.64  Procal: 0.07  D-dimer: 369>248  - on HFNC 50 LPM/60%  - c/w Dexa (since 1/9)  - c/w HFNC  - Target Sat 92-96%  - Lovenox 40 BID  - c/w Ativan PRN (tachypneia/respiratory distress seem to have anxiety component)    # H/O COPD  - s/p 1 dose solumedrol  - off Abx (had been on kalen+vanco)  - No wheezing on exam unlikely to be an exacerbation  - c/w Duonebs and spiriva    DVT: Lovenox  Diet: DASH  GI: Protonix      FOR FOLLOW UP:  [ ] Repeat inflammatory markers  [ ] Wean O2 as tolerated  [ ] ID recs

## 2021-01-11 NOTE — PROGRESS NOTE ADULT - ASSESSMENT
IMPRESSION:    Acute Hypoxemic Respiratory failure, on HHFNC 60%  COVID PNA  COPD    PLAN:    CNS: Avoid CNS depressants.    HEENT: Oral care    PULMONARY:  HOB @ 45 degrees.  Aspiration precautions.  c/w NIV, try to alternate with HFNC.  Nebz q4-6h.   start symbicort Q 12 hrs   CARDIOVASCULAR:   Keep I < O.  Avoid volume overload.     GI: GI prophylaxis.  PO.  Bowel regimen.    RENAL:  Follow up lytes.  Correct as needed.  Monitor UO.  resume flomax   INFECTIOUS DISEASE:   Panculture.  Procalcitonin. F/UP MARKERS    HEMATOLOGICAL:  D-dimer.  DVT prophylaxis.    ENDOCRINE:  Follow up FS.  Insulin protocol if needed.    MUSCULOSKELETAL: bed rest  OOb to chair   Guarded prognosis

## 2021-01-11 NOTE — PROGRESS NOTE ADULT - SUBJECTIVE AND OBJECTIVE BOX
OVERNIGHT EVENTS: events noted, on 50 /60%, no drips    Vital Signs Last 24 Hrs  T(C): 36.2 (11 Jan 2021 08:00), Max: 36.8 (10 Hari 2021 12:00)  T(F): 97.1 (11 Jan 2021 08:00), Max: 98.2 (10 Hari 2021 12:00)  HR: 56 (11 Jan 2021 08:00) (56 - 96)  BP: 135/81 (11 Jan 2021 08:00) (96/86 - 135/81)  BP(mean): 99 (11 Jan 2021 08:00) (68 - 99)  RR: 9 (11 Jan 2021 08:00) (9 - 33)  SpO2: 100% (11 Jan 2021 08:00) (99% - 100%)    PHYSICAL EXAMINATION:    GENERAL: ill looking    HEENT: Head is normocephalic and atraumatic. Extraocular muscles are intact. Mucous membranes are moist.    NECK: Supple.    LUNGS: bl crackles    HEART: Regular rate and rhythm without murmur.    ABDOMEN: Soft, nontender, and nondistended.      EXTREMITIES: Without any cyanosis, clubbing, rash, lesions or edema.    NEUROLOGIC: Grossly intact.    SKIN: No ulceration or induration present.      LABS:                        13.8   11.03 )-----------( 294      ( 11 Jan 2021 04:30 )             41.8     01-11    140  |  103  |  28<H>  ----------------------------<  103<H>  4.4   |  26  |  0.8    Ca    9.2      11 Jan 2021 04:30  Mg     2.5     01-11    TPro  6.3  /  Alb  3.7  /  TBili  0.3  /  DBili  x   /  AST  16  /  ALT  25  /  AlkPhos  91  01-11        ABG - ( 09 Jan 2021 09:27 )  pH, Arterial: 7.54  pH, Blood: x     /  pCO2: 27    /  pO2: 556   / HCO3: 23    / Base Excess: 2.2   /  SaO2: 100               CARDIAC MARKERS ( 09 Jan 2021 18:22 )  x     / <0.01 ng/mL / x     / x     / x            Serum Pro-Brain Natriuretic Peptide: 105 pg/mL (01-09-21 @ 18:22)  Serum Pro-Brain Natriuretic Peptide: 19 pg/mL (01-08-21 @ 16:50)      Procalcitonin, Serum: 0.07 ng/mL (01-08-21 @ 16:50)        01-10-21 @ 07:01  -  01-11-21 @ 07:00  --------------------------------------------------------  IN: 0 mL / OUT: 1200 mL / NET: -1200 mL        MICROBIOLOGY:  Culture Results:   No growth to date. (01-09 @ 18:20)      MEDICATIONS  (STANDING):  ALBUTerol    90 MICROgram(s) HFA Inhaler 2 Puff(s) Inhalation every 15 minutes  albuterol/ipratropium for Nebulization 3 milliLiter(s) Nebulizer every 6 hours  budesonide 160 MICROgram(s)/formoterol 4.5 MICROgram(s) Inhaler 2 Puff(s) Inhalation two times a day  chlorhexidine 4% Liquid 1 Application(s) Topical <User Schedule>  dexAMETHasone  Injectable 6 milliGRAM(s) IV Push daily  enoxaparin Injectable 40 milliGRAM(s) SubCutaneous every 12 hours  influenza   Vaccine 0.5 milliLiter(s) IntraMuscular once  LORazepam   Injectable 1 milliGRAM(s) IV Push once  pantoprazole  Injectable 40 milliGRAM(s) IV Push daily  tamsulosin 0.4 milliGRAM(s) Oral at bedtime  tiotropium 18 MICROgram(s) Capsule 1 Capsule(s) Inhalation daily    MEDICATIONS  (PRN):  LORazepam   Injectable 2 milliGRAM(s) IV Push every 8 hours PRN Anxiety  morphine  - Injectable 4 milliGRAM(s) IV Push every 4 hours PRN tachypnea.      RADIOLOGY & ADDITIONAL STUDIES:

## 2021-01-12 LAB
ALBUMIN SERPL ELPH-MCNC: 3.8 G/DL — SIGNIFICANT CHANGE UP (ref 3.5–5.2)
ALP SERPL-CCNC: 91 U/L — SIGNIFICANT CHANGE UP (ref 30–115)
ALT FLD-CCNC: 33 U/L — SIGNIFICANT CHANGE UP (ref 0–41)
ANION GAP SERPL CALC-SCNC: 12 MMOL/L — SIGNIFICANT CHANGE UP (ref 7–14)
AST SERPL-CCNC: 20 U/L — SIGNIFICANT CHANGE UP (ref 0–41)
BASOPHILS # BLD AUTO: 0.02 K/UL — SIGNIFICANT CHANGE UP (ref 0–0.2)
BASOPHILS NFR BLD AUTO: 0.2 % — SIGNIFICANT CHANGE UP (ref 0–1)
BILIRUB SERPL-MCNC: 0.3 MG/DL — SIGNIFICANT CHANGE UP (ref 0.2–1.2)
BUN SERPL-MCNC: 22 MG/DL — HIGH (ref 10–20)
CALCIUM SERPL-MCNC: 9.1 MG/DL — SIGNIFICANT CHANGE UP (ref 8.5–10.1)
CHLORIDE SERPL-SCNC: 102 MMOL/L — SIGNIFICANT CHANGE UP (ref 98–110)
CO2 SERPL-SCNC: 25 MMOL/L — SIGNIFICANT CHANGE UP (ref 17–32)
CREAT SERPL-MCNC: 0.8 MG/DL — SIGNIFICANT CHANGE UP (ref 0.7–1.5)
CRP SERPL-MCNC: 0.82 MG/DL — HIGH (ref 0–0.4)
EOSINOPHIL # BLD AUTO: 0.01 K/UL — SIGNIFICANT CHANGE UP (ref 0–0.7)
EOSINOPHIL NFR BLD AUTO: 0.1 % — SIGNIFICANT CHANGE UP (ref 0–8)
FERRITIN SERPL-MCNC: 518 NG/ML — HIGH (ref 30–400)
GLUCOSE SERPL-MCNC: 97 MG/DL — SIGNIFICANT CHANGE UP (ref 70–99)
HCT VFR BLD CALC: 41.2 % — LOW (ref 42–52)
HGB BLD-MCNC: 13.8 G/DL — LOW (ref 14–18)
IMM GRANULOCYTES NFR BLD AUTO: 0.7 % — HIGH (ref 0.1–0.3)
LYMPHOCYTES # BLD AUTO: 1.33 K/UL — SIGNIFICANT CHANGE UP (ref 1.2–3.4)
LYMPHOCYTES # BLD AUTO: 13.6 % — LOW (ref 20.5–51.1)
MAGNESIUM SERPL-MCNC: 2.4 MG/DL — SIGNIFICANT CHANGE UP (ref 1.8–2.4)
MCHC RBC-ENTMCNC: 28.8 PG — SIGNIFICANT CHANGE UP (ref 27–31)
MCHC RBC-ENTMCNC: 33.5 G/DL — SIGNIFICANT CHANGE UP (ref 32–37)
MCV RBC AUTO: 86 FL — SIGNIFICANT CHANGE UP (ref 80–94)
MONOCYTES # BLD AUTO: 0.62 K/UL — HIGH (ref 0.1–0.6)
MONOCYTES NFR BLD AUTO: 6.4 % — SIGNIFICANT CHANGE UP (ref 1.7–9.3)
NEUTROPHILS # BLD AUTO: 7.71 K/UL — HIGH (ref 1.4–6.5)
NEUTROPHILS NFR BLD AUTO: 79 % — HIGH (ref 42.2–75.2)
NRBC # BLD: 0 /100 WBCS — SIGNIFICANT CHANGE UP (ref 0–0)
PLATELET # BLD AUTO: 303 K/UL — SIGNIFICANT CHANGE UP (ref 130–400)
POTASSIUM SERPL-MCNC: 4.2 MMOL/L — SIGNIFICANT CHANGE UP (ref 3.5–5)
POTASSIUM SERPL-SCNC: 4.2 MMOL/L — SIGNIFICANT CHANGE UP (ref 3.5–5)
PROCALCITONIN SERPL-MCNC: 0.03 NG/ML — SIGNIFICANT CHANGE UP (ref 0.02–0.1)
PROT SERPL-MCNC: 6.4 G/DL — SIGNIFICANT CHANGE UP (ref 6–8)
RBC # BLD: 4.79 M/UL — SIGNIFICANT CHANGE UP (ref 4.7–6.1)
RBC # FLD: 13 % — SIGNIFICANT CHANGE UP (ref 11.5–14.5)
SODIUM SERPL-SCNC: 139 MMOL/L — SIGNIFICANT CHANGE UP (ref 135–146)
WBC # BLD: 9.76 K/UL — SIGNIFICANT CHANGE UP (ref 4.8–10.8)
WBC # FLD AUTO: 9.76 K/UL — SIGNIFICANT CHANGE UP (ref 4.8–10.8)

## 2021-01-12 PROCEDURE — 71045 X-RAY EXAM CHEST 1 VIEW: CPT | Mod: 26

## 2021-01-12 PROCEDURE — 99232 SBSQ HOSP IP/OBS MODERATE 35: CPT

## 2021-01-12 PROCEDURE — 99233 SBSQ HOSP IP/OBS HIGH 50: CPT

## 2021-01-12 RX ADMIN — BUDESONIDE AND FORMOTEROL FUMARATE DIHYDRATE 2 PUFF(S): 160; 4.5 AEROSOL RESPIRATORY (INHALATION) at 20:52

## 2021-01-12 RX ADMIN — BUDESONIDE AND FORMOTEROL FUMARATE DIHYDRATE 2 PUFF(S): 160; 4.5 AEROSOL RESPIRATORY (INHALATION) at 08:12

## 2021-01-12 RX ADMIN — TAMSULOSIN HYDROCHLORIDE 0.4 MILLIGRAM(S): 0.4 CAPSULE ORAL at 20:52

## 2021-01-12 RX ADMIN — ENOXAPARIN SODIUM 40 MILLIGRAM(S): 100 INJECTION SUBCUTANEOUS at 05:29

## 2021-01-12 RX ADMIN — PANTOPRAZOLE SODIUM 40 MILLIGRAM(S): 20 TABLET, DELAYED RELEASE ORAL at 05:29

## 2021-01-12 RX ADMIN — Medication 6 MILLIGRAM(S): at 05:29

## 2021-01-12 RX ADMIN — ENOXAPARIN SODIUM 40 MILLIGRAM(S): 100 INJECTION SUBCUTANEOUS at 17:14

## 2021-01-12 RX ADMIN — Medication 2 MILLIGRAM(S): at 08:33

## 2021-01-12 NOTE — PROGRESS NOTE ADULT - SUBJECTIVE AND OBJECTIVE BOX
Patient is a 60y old  Male who presents with a chief complaint of Shortness of Breath (11 Jan 2021 10:46)        Over Night Events:  On HFNCO2.  Off pressors         ROS:     All ROS are negative except HPI         PHYSICAL EXAM    ICU Vital Signs Last 24 Hrs  T(C): 35.8 (12 Jan 2021 05:00), Max: 36.8 (11 Jan 2021 12:00)  T(F): 96.5 (12 Jan 2021 05:00), Max: 98.2 (11 Jan 2021 12:00)  HR: 68 (12 Jan 2021 05:00) (56 - 100)  BP: 120/74 (12 Jan 2021 05:00) (95/66 - 136/89)  BP(mean): 88 (11 Jan 2021 21:00) (72 - 99)  ABP: --  ABP(mean): --  RR: 20 (12 Jan 2021 05:00) (9 - 33)  SpO2: 97% (12 Jan 2021 05:00) (95% - 100%)      CONSTITUTIONAL:  Well nourished.  NAD    ENT:   Airway patent,   Mouth with normal mucosa.   No thrush    EYES:   Pupils equal,   Round and reactive to light.    CARDIAC:   Normal rate,   Regular rhythm.    No edema      Vascular:  Normal systolic impulse  No Carotid bruits    RESPIRATORY:   No wheezing  Bilateral crackles   Normal chest expansion  Not tachypneic,  No use of accessory muscles    GASTROINTESTINAL:  Abdomen soft,   Non-tender,   No guarding,   + BS    MUSCULOSKELETAL:   Range of motion is not limited,  No clubbing, cyanosis    NEUROLOGICAL:   Alert and oriented   No motor  deficits.    SKIN:   Skin normal color for race,   Warm and dry and intact.   No evidence of rash.    PSYCHIATRIC:   Normal mood and affect.   No apparent risk to self or others.    HEMATOLOGICAL:  No cervical  lymphadenopathy.  no inguinal lymphadenopathy      01-10-21 @ 07:01  -  01-11-21 @ 07:00  --------------------------------------------------------  IN:  Total IN: 0 mL    OUT:    Voided (mL): 1200 mL  Total OUT: 1200 mL    Total NET: -1200 mL      01-11-21 @ 07:01  -  01-12-21 @ 06:25  --------------------------------------------------------  IN:    Oral Fluid: 300 mL  Total IN: 300 mL    OUT:    Voided (mL): 1450 mL  Total OUT: 1450 mL    Total NET: -1150 mL          LABS:                            13.8   11.03 )-----------( 294      ( 11 Jan 2021 04:30 )             41.8                                               01-11    140  |  103  |  28<H>  ----------------------------<  103<H>  4.4   |  26  |  0.8    Ca    9.2      11 Jan 2021 04:30  Mg     2.5     01-11    TPro  6.3  /  Alb  3.7  /  TBili  0.3  /  DBili  x   /  AST  16  /  ALT  25  /  AlkPhos  91  01-11                                                                                           LIVER FUNCTIONS - ( 11 Jan 2021 04:30 )  Alb: 3.7 g/dL / Pro: 6.3 g/dL / ALK PHOS: 91 U/L / ALT: 25 U/L / AST: 16 U/L / GGT: x                                                  Culture - Blood (collected 09 Jan 2021 18:20)  Source: .Blood Blood-Peripheral  Preliminary Report (11 Jan 2021 02:29):    No growth to date.                                                                                       ABG - ( 11 Jan 2021 10:35 )  pH, Arterial: 7.48  pH, Blood: x     /  pCO2: 34    /  pO2: 94    / HCO3: 26    / Base Excess: 2.6   /  SaO2: 98                  MEDICATIONS  (STANDING):  ALBUTerol    90 MICROgram(s) HFA Inhaler 2 Puff(s) Inhalation every 15 minutes  albuterol/ipratropium for Nebulization 3 milliLiter(s) Nebulizer every 6 hours  budesonide 160 MICROgram(s)/formoterol 4.5 MICROgram(s) Inhaler 2 Puff(s) Inhalation two times a day  chlorhexidine 4% Liquid 1 Application(s) Topical <User Schedule>  dexAMETHasone  Injectable 6 milliGRAM(s) IV Push daily  enoxaparin Injectable 40 milliGRAM(s) SubCutaneous every 12 hours  influenza   Vaccine 0.5 milliLiter(s) IntraMuscular once  LORazepam   Injectable 1 milliGRAM(s) IV Push once  pantoprazole    Tablet 40 milliGRAM(s) Oral before breakfast  tamsulosin 0.4 milliGRAM(s) Oral at bedtime  tiotropium 18 MICROgram(s) Capsule 1 Capsule(s) Inhalation daily    MEDICATIONS  (PRN):  LORazepam   Injectable 2 milliGRAM(s) IV Push every 8 hours PRN Anxiety  morphine  - Injectable 4 milliGRAM(s) IV Push every 4 hours PRN tachypnea.      New X-rays reviewed:                                                                                  ECHO    CXR interpreted by me:  Bilateral infiltrates

## 2021-01-12 NOTE — PROGRESS NOTE ADULT - ASSESSMENT
IMPRESSION:    Acute Hypoxemic Respiratory failure, on HHFNC 60%  COVID PNA  HO COPD    PLAN:    CNS: Avoid CNS depressants.    HEENT: Oral care    PULMONARY:  HOB @ 45 degrees.  Aspiration precautions.  Wean o2 as tolerated.  Symbicort Q 12 hrs     CARDIOVASCULAR:  Avoid volume overload.     GI: GI prophylaxis.  PO feeding.  Bowel regimen.    RENAL:  Follow up lytes.  Correct as needed.  Monitor UO.    INFECTIOUS DISEASE:   Panculture.  Procalcitonin. F/UP MARKERS.  Dexa D#5    HEMATOLOGICAL: DVT prophylaxis.  LMWH     ENDOCRINE:  Follow up FS.  Insulin protocol if needed.    MUSCULOSKELETAL:  OOB to chair   Guarded prognosis

## 2021-01-12 NOTE — PROGRESS NOTE ADULT - SUBJECTIVE AND OBJECTIVE BOX
Patient is a 60y old  Male who presents with a chief complaint of Shortness of Breath (12 Jan 2021 06:25)      HPI:  60 year old Male former smoker(2 packs since age of 17, quit 4 years ago) with past medical history of COPD not on home oxygen presents with worsening Shortness of Breath for one week.     Patient reports that since 27th he has been having intermittent fevers associated with dry cough, rigors, chills, loss of taste and smell. He tested positive on the 28th. For last one week he reports progressive shortness of breath, dry cough and increased inspiratory effort associated with sharp non radiating chest pain with deep inspiration. Patient also reports diarrhea for last 5 days watery, non bloody. He saw his PCP Dr. Pollard who referred him to ED. Patient denies any abdominal pain, hypoxia, numbness, tingling, dysuria or any other complaints.    In ED patient afebrile, hemodynamically stable, tachypneic to 30s placed on Bipap saturating 100% on 12/6/40%. CTA Chest negative for PE. In ED patient given one dose of Versed given to reduce the rate with improvement, Solumedrol 125, Magnesium and nebulizer treatments. At time of interview patient also anxious about prognosis and remains tachypneic. (08 Jan 2021 22:00)      PAST MEDICAL & SURGICAL HISTORY:  No pertinent past medical history        Home Medications:  Albuterol (Eqv-ProAir HFA) 90 mcg/inh inhalation aerosol: 2 puff(s) inhaled every 6 hours, As Needed (08 Jan 2021 22:09)      .  Tobacco use:   EtOH use:  Illicit drug use:    Living situation:    Allergies:  No Known Allergies      FAMILY HISTORY:      Hospital Medications:  ALBUTerol    90 MICROgram(s) HFA Inhaler 2 Puff(s) Inhalation every 15 minutes  albuterol/ipratropium for Nebulization 3 milliLiter(s) Nebulizer every 6 hours  budesonide 160 MICROgram(s)/formoterol 4.5 MICROgram(s) Inhaler 2 Puff(s) Inhalation two times a day  chlorhexidine 4% Liquid 1 Application(s) Topical <User Schedule>  dexAMETHasone  Injectable 6 milliGRAM(s) IV Push daily  enoxaparin Injectable 40 milliGRAM(s) SubCutaneous every 12 hours  influenza   Vaccine 0.5 milliLiter(s) IntraMuscular once  LORazepam   Injectable 2 milliGRAM(s) IV Push every 8 hours PRN  LORazepam   Injectable 1 milliGRAM(s) IV Push once  morphine  - Injectable 4 milliGRAM(s) IV Push every 4 hours PRN  pantoprazole    Tablet 40 milliGRAM(s) Oral before breakfast  tamsulosin 0.4 milliGRAM(s) Oral at bedtime  tiotropium 18 MICROgram(s) Capsule 1 Capsule(s) Inhalation daily      Vital Signs Last 24 Hrs  T(C): 35.8 (12 Jan 2021 05:00), Max: 36.8 (11 Jan 2021 12:00)  T(F): 96.5 (12 Jan 2021 05:00), Max: 98.2 (11 Jan 2021 12:00)  HR: 68 (12 Jan 2021 05:00) (66 - 100)  BP: 120/74 (12 Jan 2021 05:00) (95/66 - 136/89)  BP(mean): 88 (11 Jan 2021 21:00) (72 - 94)  RR: 20 (12 Jan 2021 05:00) (13 - 33)  SpO2: 97% (12 Jan 2021 05:00) (95% - 100%)    I&O's Summary    11 Jan 2021 07:01  -  12 Jan 2021 07:00  --------------------------------------------------------  IN: 300 mL / OUT: 1450 mL / NET: -1150 mL        LABS:                        13.8   11.03 )-----------( 294      ( 11 Jan 2021 04:30 )             41.8       01-11    140  |  103  |  28<H>  ----------------------------<  103<H>  4.4   |  26  |  0.8    Ca    9.2      11 Jan 2021 04:30  Mg     2.5     01-11    TPro  6.3  /  Alb  3.7  /  TBili  0.3  /  DBili  x   /  AST  16  /  ALT  25  /  AlkPhos  91  01-11              Culture - Blood (collected 09 Jan 2021 18:20)  Source: .Blood Blood-Peripheral  Preliminary Report (11 Jan 2021 02:29):    No growth to date.        PHYSICAL EXAM:  GENERAL: NAD, lying in bed, appears anxious, on HFNC  HEAD:  Atraumatic, Normocephalic  EYES: EOMI, conjunctiva and sclera clear  ENT: Moist mucous membranes  NECK: Supple, No JVD  CHEST/LUNG: Crackles heard in BL posterior lung fields   HEART: Regular rate and rhythm; No murmurs, rubs, or gallops  ABDOMEN: Bowel sounds present; Soft, Nontender, Nondistended.   EXTREMITIES: No clubbing, cyanosis, or edema  NERVOUS SYSTEM:  Alert & Oriented X3, speech clear. No deficits     IMAGES:  < from: Xray Chest 1 View- PORTABLE-Routine (Xray Chest 1 View- PORTABLE-Routine in AM.) (01.10.21 @ 06:51) >  Impression:  Unchanged bilateral opacities, right greater than left.    < from: Xray Chest 1 View-PORTABLE IMMEDIATE (Xray Chest 1 View-PORTABLE IMMEDIATE .) (01.09.21 @ 10:18) >  Impression:  Stable, faint peripheral pulmonary opacities, right greater than left.    < from: CT Angio Chest PE Protocol w/ IV Cont (01.08.21 @ 18:38) >  IMPRESSION:  1.  No evidence of pulmonary embolism.  2.  Bilateral faint groundglass opacities right lung greater than left lung could reflect an infectious/inflammatory process including Covid pneumonia.  3.  Mild to moderate emphysematous changes throughout the lungs.

## 2021-01-12 NOTE — PROGRESS NOTE ADULT - ASSESSMENT
60 year old Male former smoker(2 packs since age of 17, quit 4 years ago) with past medical history of COPD not on home oxygen presents with worsening Shortness of Breath for one week.     Patient reports that since 27th he has been having intermittent fevers associated with dry cough, rigors, chills, loss of taste and smell. He tested positive on the 28th. For last one week he reports progressive shortness of breath, dry cough and increased inspiratory effort associated with sharp non radiating chest pain with deep inspiration. Patient also reports diarrhea for last 5 days watery, non bloody.    IMPRESSION;  Clinically improved  COVID 19 with critical illness. HFNC  Steroids beneficial.  Pt is in the late inflammatory response phase ot the illness based on the onset of symptoms.  procalcitonin 0.07 >0.03   Ferritin 365  CRP 4.6>2.2  Ddimers 369>260  CT chest : no PE, few opacities    RECOMMENDATIONS;  Dexamethasone 6 mg iv q24h for 10 days   Monitor for side effects: hyperglycemia, neurological ( agitation/confusion), adrenal suppression, bacterial and fungal infections

## 2021-01-12 NOTE — PROGRESS NOTE ADULT - SUBJECTIVE AND OBJECTIVE BOX
HALI MIMS  60y, Male  Allergy: No Known Allergies    Hospital Day: 4d    Patient seen and examined earlier today. No acute events overnight.    PMH/PSH:  PAST MEDICAL & SURGICAL HISTORY:  No pertinent past medical history    VITALS:  T(F): 97.3 (01-12-21 @ 08:00), Max: 98.2 (01-11-21 @ 12:00)  HR: 88 (01-12-21 @ 08:00)  BP: 132/82 (01-12-21 @ 08:00) (95/66 - 136/89)  RR: 20 (01-12-21 @ 08:00)  SpO2: 100% (01-12-21 @ 08:49)    TESTS & MEASUREMENTS:  Weight (Kg):   BMI (kg/m2): 29.8 (01-09)    01-10-21 @ 07:01  -  01-11-21 @ 07:00  --------------------------------------------------------  IN: 0 mL / OUT: 1200 mL / NET: -1200 mL    01-11-21 @ 07:01 - 01-12-21 @ 07:00  --------------------------------------------------------  IN: 300 mL / OUT: 1450 mL / NET: -1150 mL    01-12-21 @ 07:01  -  01-12-21 @ 09:59  --------------------------------------------------------  IN: 240 mL / OUT: 200 mL / NET: 40 mL                        13.8   9.76  )-----------( 303      ( 12 Jan 2021 04:30 )             41.2     01-12    139  |  102  |  22<H>  ----------------------------<  97  4.2   |  25  |  0.8    Ca    9.1      12 Jan 2021 04:30  Mg     2.4     01-12    TPro  6.4  /  Alb  3.8  /  TBili  0.3  /  DBili  x   /  AST  20  /  ALT  33  /  AlkPhos  91  01-12    LIVER FUNCTIONS - ( 12 Jan 2021 04:30 )  Alb: 3.8 g/dL / Pro: 6.4 g/dL / ALK PHOS: 91 U/L / ALT: 33 U/L / AST: 20 U/L / GGT: x           Culture - Blood (collected 01-09-21 @ 18:20)  Source: .Blood Blood-Peripheral  Preliminary Report (01-11-21 @ 02:29):    No growth to date.    MEDICATIONS:  MEDICATIONS  (STANDING):  ALBUTerol    90 MICROgram(s) HFA Inhaler 2 Puff(s) Inhalation every 15 minutes  albuterol/ipratropium for Nebulization 3 milliLiter(s) Nebulizer every 6 hours  budesonide 160 MICROgram(s)/formoterol 4.5 MICROgram(s) Inhaler 2 Puff(s) Inhalation two times a day  chlorhexidine 4% Liquid 1 Application(s) Topical <User Schedule>  dexAMETHasone  Injectable 6 milliGRAM(s) IV Push daily  enoxaparin Injectable 40 milliGRAM(s) SubCutaneous every 12 hours  influenza   Vaccine 0.5 milliLiter(s) IntraMuscular once  LORazepam   Injectable 1 milliGRAM(s) IV Push once  pantoprazole    Tablet 40 milliGRAM(s) Oral before breakfast  tamsulosin 0.4 milliGRAM(s) Oral at bedtime  tiotropium 18 MICROgram(s) Capsule 1 Capsule(s) Inhalation daily    MEDICATIONS  (PRN):  LORazepam   Injectable 2 milliGRAM(s) IV Push every 8 hours PRN Anxiety  morphine  - Injectable 4 milliGRAM(s) IV Push every 4 hours PRN tachypnea.    HOME MEDICATIONS:  Albuterol (Eqv-ProAir HFA) 90 mcg/inh inhalation aerosol (01-08)    REVIEW OF SYSTEMS:  All other review of systems is negative unless indicated above.     PHYSICAL EXAM:  GENERAL: NAD  HEENT: No Swelling  CHEST/LUNG: Good air entry, No wheezing  HEART: RRR, No murmurs  ABDOMEN: Soft, Bowel sounds present  EXTREMITIES:  No clubbing

## 2021-01-12 NOTE — PROGRESS NOTE ADULT - SUBJECTIVE AND OBJECTIVE BOX
HALI MIMS  60y, Male    All available historical data reviewed    OVERNIGHT EVENTS:  no fevers  feels well and has no complaints  HFNC    ROS:  General: Denies rigors, nightsweats  HEENT: Denies headache, rhinorrhea, sore throat, eye pain  CV: Denies CP, palpitations  PULM: Denies wheezing, hemoptysis  GI: Denies hematemesis, hematochezia, melena  : Denies discharge, hematuria  MSK: Denies arthralgias, myalgias  SKIN: Denies rash, lesions  NEURO: Denies paresthesias, weakness  PSYCH: Denies depression, anxiety    VITALS:  T(F): 97.3, Max: 98.2 (01-11-21 @ 12:00)  HR: 88  BP: 132/82  RR: 20Vital Signs Last 24 Hrs  T(C): 36.3 (12 Jan 2021 08:00), Max: 36.8 (11 Jan 2021 12:00)  T(F): 97.3 (12 Jan 2021 08:00), Max: 98.2 (11 Jan 2021 12:00)  HR: 88 (12 Jan 2021 08:00) (66 - 100)  BP: 132/82 (12 Jan 2021 08:00) (101/59 - 136/89)  BP(mean): 88 (11 Jan 2021 21:00) (72 - 94)  RR: 20 (12 Jan 2021 08:00) (13 - 33)  SpO2: 100% (12 Jan 2021 08:49) (95% - 100%)    TESTS & MEASUREMENTS:                        13.8   9.76  )-----------( 303      ( 12 Jan 2021 04:30 )             41.2     01-12    139  |  102  |  22<H>  ----------------------------<  97  4.2   |  25  |  0.8    Ca    9.1      12 Jan 2021 04:30  Mg     2.4     01-12    TPro  6.4  /  Alb  3.8  /  TBili  0.3  /  DBili  x   /  AST  20  /  ALT  33  /  AlkPhos  91  01-12    LIVER FUNCTIONS - ( 12 Jan 2021 04:30 )  Alb: 3.8 g/dL / Pro: 6.4 g/dL / ALK PHOS: 91 U/L / ALT: 33 U/L / AST: 20 U/L / GGT: x             Culture - Blood (collected 01-09-21 @ 18:20)  Source: .Blood Blood-Peripheral  Preliminary Report (01-11-21 @ 02:29):    No growth to date.            RADIOLOGY & ADDITIONAL TESTS:  Personal review of radiological diagnostics performed  Echo and EKG results noted when applicable.     MEDICATIONS:  ALBUTerol    90 MICROgram(s) HFA Inhaler 2 Puff(s) Inhalation every 15 minutes  albuterol/ipratropium for Nebulization 3 milliLiter(s) Nebulizer every 6 hours  budesonide 160 MICROgram(s)/formoterol 4.5 MICROgram(s) Inhaler 2 Puff(s) Inhalation two times a day  chlorhexidine 4% Liquid 1 Application(s) Topical <User Schedule>  dexAMETHasone  Injectable 6 milliGRAM(s) IV Push daily  enoxaparin Injectable 40 milliGRAM(s) SubCutaneous every 12 hours  influenza   Vaccine 0.5 milliLiter(s) IntraMuscular once  LORazepam   Injectable 2 milliGRAM(s) IV Push every 8 hours PRN  LORazepam   Injectable 1 milliGRAM(s) IV Push once  morphine  - Injectable 4 milliGRAM(s) IV Push every 4 hours PRN  pantoprazole    Tablet 40 milliGRAM(s) Oral before breakfast  tamsulosin 0.4 milliGRAM(s) Oral at bedtime  tiotropium 18 MICROgram(s) Capsule 1 Capsule(s) Inhalation daily      ANTIBIOTICS:

## 2021-01-12 NOTE — PROGRESS NOTE ADULT - ASSESSMENT
60 year old Male former smoker(2 packs since age of 17, quit 4 years ago) with past medical history of COPD not on home oxygen presents with worsening Shortness of Breath for one week. Patient has shortness of breath secondary to COVID exacerbated by anxiety of prognosis.    #Acute Hypoxic Respiratory Failure  #COVID19 PNA  #h/o COPD not on Home O2  Currently saturating 98% on HFNC 50L/40%  - Cont decadron 6mg qd (Start 01/09)  - Trend inflammatory markers  - Cont symbicort BID  - Downtitrate oxygen requirements as tolerated    #BPH  - Cont flomax    DVT PPX, Lovenox

## 2021-01-12 NOTE — PROGRESS NOTE ADULT - ASSESSMENT
60 year old Male former smoker(2 packs since age of 17, quit 4 years ago) with past medical history of COPD not on home oxygen presents with worsening Shortness of Breath for one week. Patient has shortness of breath secondary to COVID exacerbated by anxiety of prognosis. Patient counselled extensively and given morphine to reduce the breathing effort and help him breathe better.    # Shortness of Breath 2/2 COVID Pneumonia  - Febrile at home, afebrile in ED, tachypneic to 30s in ED  - Leukopenia present, D-dimer 369 COVID +ve  - CT shows bilateral ground-glass opacities (No PE)  - LE duplex -ve 1/9  - No ABX per ID  - Inflammatory markers: Ferritin: 365, CRP: 4.64>1.95, Procal: 0.07>0.04, D-dimer: 369>260  - on HFNC 50 LPM/60%, wean down oxygen requirements as tolerated   - c/w Dexa (since 1/9)  - Lovenox 40 BID  - c/w Ativan PRN (tachypneia/respiratory distress seem to have anxiety component)    # H/O COPD  - s/p 1 dose solumedrol  - off Abx (had been on kalen+vanco)  - No wheezing on exam   - c/w Duonebs and spiriva    DVT: Lovenox  Diet: DASH  GI: Protonix  Activity: as tolerated     Disposition: acute pending clinical improvement

## 2021-01-13 ENCOUNTER — TRANSCRIPTION ENCOUNTER (OUTPATIENT)
Age: 61
End: 2021-01-13

## 2021-01-13 LAB
ALBUMIN SERPL ELPH-MCNC: 3.7 G/DL — SIGNIFICANT CHANGE UP (ref 3.5–5.2)
ALP SERPL-CCNC: 95 U/L — SIGNIFICANT CHANGE UP (ref 30–115)
ALT FLD-CCNC: 53 U/L — HIGH (ref 0–41)
ANION GAP SERPL CALC-SCNC: 11 MMOL/L — SIGNIFICANT CHANGE UP (ref 7–14)
AST SERPL-CCNC: 28 U/L — SIGNIFICANT CHANGE UP (ref 0–41)
BILIRUB SERPL-MCNC: 0.6 MG/DL — SIGNIFICANT CHANGE UP (ref 0.2–1.2)
BUN SERPL-MCNC: 18 MG/DL — SIGNIFICANT CHANGE UP (ref 10–20)
CALCIUM SERPL-MCNC: 8.8 MG/DL — SIGNIFICANT CHANGE UP (ref 8.5–10.1)
CHLORIDE SERPL-SCNC: 102 MMOL/L — SIGNIFICANT CHANGE UP (ref 98–110)
CO2 SERPL-SCNC: 25 MMOL/L — SIGNIFICANT CHANGE UP (ref 17–32)
CREAT SERPL-MCNC: 0.7 MG/DL — SIGNIFICANT CHANGE UP (ref 0.7–1.5)
CRP SERPL-MCNC: 0.34 MG/DL — SIGNIFICANT CHANGE UP (ref 0–0.4)
GLUCOSE SERPL-MCNC: 110 MG/DL — HIGH (ref 70–99)
HCT VFR BLD CALC: 41.9 % — LOW (ref 42–52)
HGB BLD-MCNC: 14.1 G/DL — SIGNIFICANT CHANGE UP (ref 14–18)
MAGNESIUM SERPL-MCNC: 2.3 MG/DL — SIGNIFICANT CHANGE UP (ref 1.8–2.4)
MCHC RBC-ENTMCNC: 28.8 PG — SIGNIFICANT CHANGE UP (ref 27–31)
MCHC RBC-ENTMCNC: 33.7 G/DL — SIGNIFICANT CHANGE UP (ref 32–37)
MCV RBC AUTO: 85.5 FL — SIGNIFICANT CHANGE UP (ref 80–94)
NRBC # BLD: 0 /100 WBCS — SIGNIFICANT CHANGE UP (ref 0–0)
PLATELET # BLD AUTO: 312 K/UL — SIGNIFICANT CHANGE UP (ref 130–400)
POTASSIUM SERPL-MCNC: 4.7 MMOL/L — SIGNIFICANT CHANGE UP (ref 3.5–5)
POTASSIUM SERPL-SCNC: 4.7 MMOL/L — SIGNIFICANT CHANGE UP (ref 3.5–5)
PROCALCITONIN SERPL-MCNC: 0.04 NG/ML — SIGNIFICANT CHANGE UP (ref 0.02–0.1)
PROT SERPL-MCNC: 6.9 G/DL — SIGNIFICANT CHANGE UP (ref 6–8)
RBC # BLD: 4.9 M/UL — SIGNIFICANT CHANGE UP (ref 4.7–6.1)
RBC # FLD: 12.8 % — SIGNIFICANT CHANGE UP (ref 11.5–14.5)
SODIUM SERPL-SCNC: 138 MMOL/L — SIGNIFICANT CHANGE UP (ref 135–146)
WBC # BLD: 7.17 K/UL — SIGNIFICANT CHANGE UP (ref 4.8–10.8)
WBC # FLD AUTO: 7.17 K/UL — SIGNIFICANT CHANGE UP (ref 4.8–10.8)

## 2021-01-13 PROCEDURE — 99232 SBSQ HOSP IP/OBS MODERATE 35: CPT

## 2021-01-13 RX ORDER — ACETAMINOPHEN 500 MG
650 TABLET ORAL EVERY 6 HOURS
Refills: 0 | Status: DISCONTINUED | OUTPATIENT
Start: 2021-01-13 | End: 2021-01-16

## 2021-01-13 RX ADMIN — Medication 650 MILLIGRAM(S): at 10:30

## 2021-01-13 RX ADMIN — ENOXAPARIN SODIUM 40 MILLIGRAM(S): 100 INJECTION SUBCUTANEOUS at 17:19

## 2021-01-13 RX ADMIN — Medication 6 MILLIGRAM(S): at 04:00

## 2021-01-13 RX ADMIN — Medication 2 MILLIGRAM(S): at 04:14

## 2021-01-13 RX ADMIN — BUDESONIDE AND FORMOTEROL FUMARATE DIHYDRATE 2 PUFF(S): 160; 4.5 AEROSOL RESPIRATORY (INHALATION) at 19:46

## 2021-01-13 RX ADMIN — TAMSULOSIN HYDROCHLORIDE 0.4 MILLIGRAM(S): 0.4 CAPSULE ORAL at 20:58

## 2021-01-13 RX ADMIN — PANTOPRAZOLE SODIUM 40 MILLIGRAM(S): 20 TABLET, DELAYED RELEASE ORAL at 05:49

## 2021-01-13 RX ADMIN — ENOXAPARIN SODIUM 40 MILLIGRAM(S): 100 INJECTION SUBCUTANEOUS at 04:00

## 2021-01-13 NOTE — PROGRESS NOTE ADULT - ASSESSMENT
IMPRESSION:    Acute Hypoxemic Respiratory failure improved   COVID PNA  HO COPD    PLAN:    CNS: Avoid CNS depressants.    HEENT: Oral care    PULMONARY:  HOB @ 45 degrees.  Aspiration precautions.  Wean o2 as tolerated.  Symbicort Q 12 hrs     CARDIOVASCULAR:  Avoid volume overload.     GI: GI prophylaxis.  PO feeding.  Bowel regimen.    RENAL:  Follow up lytes.  Correct as needed.  Monitor UO.    INFECTIOUS DISEASE:   Panculture.  Dexa D#6    HEMATOLOGICAL: DVT prophylaxis.  LMWH     ENDOCRINE:  Follow up FS.     MUSCULOSKELETAL:  OOB to chair     DC planning.

## 2021-01-13 NOTE — DISCHARGE NOTE PROVIDER - HOSPITAL COURSE
HPI:  60 year old Male former smoker(2 packs since age of 17, quit 4 years ago) with past medical history of COPD not on home oxygen presents with worsening Shortness of Breath for one week.   Patient reports that since 27th he has been having intermittent fevers associated with dry cough, rigors, chills, loss of taste and smell. He tested positive on the 28th. For last one week he reports progressive shortness of breath, dry cough and increased inspiratory effort associated with sharp non radiating chest pain with deep inspiration. Patient also reports diarrhea for last 5 days watery, non bloody. He saw his PCP Dr. Pollard who referred him to ED. Patient denies any abdominal pain, hypoxia, numbness, tingling, dysuria or any other complaints.  In ED patient afebrile, hemodynamically stable, tachypneic to 30s placed on Bipap saturating 100% on 12/6/40%. CTA Chest negative for PE. In ED patient given one dose of Versed given to reduce the rate with improvement, Solumedrol 125, Magnesium and nebulizer treatments. At time of interview patient also anxious about prognosis and remains tachypneic.    4B course: Patient was on 4B when admitted. On 01/09 patient became tachypneic and was desating to the 80s on Bipap. ABGs showed ph 7.54, pco2 27, po2 556, lactate 1.9. Anesthesia called but intubation was held off d/t improvement in tachypnea. Patient evaluated by critical care team and patient approved for upgraded to ICU for further management.  ICU Course: While in ICU, pt tolerated HFNC and did not require any BiPAP or intubation. Pt seen by ID who recommended no ABX. For covid, pt is being treated with dexamethasone 6mg qd, but no need for RDV or CP at current time. Repeat inflammatory markers were ordered and collected. Pt also placed on duonebs and spiriva for underlying COPD. Finally, pt started on flomax for difficulty urinating (states he has h/o BPH)   CEU Course: pt was downgraded to CEU on 01/11. Patient remained stable on HFNC and during the course of his stay his oxygen requirements decreased. He is now on 2L NC and sating 98% at rest. Patient was considered to be discharged but on ambulation his saturation dropped to 79%. Patient downgraded to floor for observation.    Patient stayed on the floor for one day. He was evaluated by PT. Patient is now medically stable to be discharged home on oxygen. 61 yo M with PMHx of COPD (not on home O2), former smoker who presented with worsening sob x1 wk after testing covid+ on 12/28.   60 year old Male former smoker(2 packs since age of 17, quit 4 years ago) with past medical history of COPD not on home oxygen presents with worsening Shortness of Breath for one week.   Pt reports that 12/27 he has been having intermittent fevers associated with dry cough, rigors, chills, loss of taste and smell. For last 1 week he had progressive sob, dry cough and increased inspiratory effort associated with sharp nonradiating cp with deep inspiration. Also with watery nonbloody diarrhea for last 5 days. He saw his PCP Dr. Pollard who referred him to ED. In the ED, pt was afebrile, tachypneic to 30s and placed on BiPAP saturating 100%. CTA chest 1/8 was negative for PE. Pt was admitted to the floor. On 1/9 pt became tachypneic and desatted to 80s on BiPAP and was upgraded to ICU. He tolerated HFNC and did not require further BiPAP or intubation. Seen by ID who recommended no abx, RDV, or convalescent plasma. Completed course of dexamethasone. Pt was downgraded to CEU on 1/11 and then to floor. He was transferred to [facility] for further management. Pt weaned off of NC completely and was evaluated by PT. He requested 2 negative covid swabs before returning home as he lives with his brother who has COPD. Pt to be sent home with margie as ppx.    59 yo M with PMHx of COPD (not on home O2), former smoker who presented with worsening sob x1 wk after testing covid+ on 12/28.   60 year old Male former smoker(2 packs since age of 17, quit 4 years ago) with past medical history of COPD not on home oxygen presents with worsening Shortness of Breath for one week.   Pt reports that 12/27 he has been having intermittent fevers associated with dry cough, rigors, chills, loss of taste and smell. For last 1 week he had progressive sob, dry cough and increased inspiratory effort associated with sharp nonradiating cp with deep inspiration. Also with watery nonbloody diarrhea for last 5 days. He saw his PCP Dr. Pollard who referred him to ED. In the ED, pt was afebrile, tachypneic to 30s and placed on BiPAP saturating 100%. CTA chest 1/8 was negative for PE. Pt was admitted to the floor. On 1/9 pt became tachypneic and desatted to 80s on BiPAP and was upgraded to ICU. He tolerated HFNC and did not require further BiPAP or intubation. Seen by ID who recommended no abx, RDV, or convalescent plasma. Completed course of dexamethasone. Pt was downgraded to CEU on 1/11 and then to floor. He was transferred to [facility] for further management. Pt weaned off of NC completely and was evaluated by PT. He requested 2 negative covid swabs before returning home as he lives with his brother who has COPD. Pt to be sent home with eliquis as ppx.    59 yo M with PMHx of COPD (not on home O2), former smoker who presented with worsening sob x1 wk after testing covid+ on 12/28. Pt reports that 12/27 he has been having intermittent fevers associated with dry cough, rigors, chills, loss of taste and smell. For last 1 week he had progressive sob, dry cough and increased inspiratory effort associated with sharp nonradiating cp with deep inspiration. Also with watery nonbloody diarrhea for last 5 days. He saw his PCP Dr. Pollard who referred him to ED. In the ED, pt was afebrile, tachypneic to 30s and placed on BiPAP saturating 100%. CTA chest 1/8 was negative for PE. Pt was admitted to the floor. On 1/9 pt became tachypneic and desatted to 80s on BiPAP and was upgraded to ICU. He tolerated HFNC and did not require further BiPAP or intubation. Seen by ID who recommended no abx, RDV, or convalescent plasma. Completed course of dexamethasone. Pt was downgraded to CEU on 1/11 and then to floor. He was transferred to [facility] for further management. Pt weaned off of NC completely and was evaluated by PT. He requested 2 negative covid swabs before returning home as he lives with his brother who has COPD. Pt to be sent home with eliquis as ppx.

## 2021-01-13 NOTE — PROGRESS NOTE ADULT - SUBJECTIVE AND OBJECTIVE BOX
Patient is a 60y old  Male who presents with a chief complaint of Shortness of Breath (12 Jan 2021 10:24)        Over Night Events:  on NC.  Off pressors.         ROS:     All ROS are negative except HPI         PHYSICAL EXAM    ICU Vital Signs Last 24 Hrs  T(C): 36.3 (13 Jan 2021 04:00), Max: 36.3 (12 Jan 2021 08:00)  T(F): 97.3 (13 Jan 2021 04:00), Max: 97.3 (12 Jan 2021 08:00)  HR: 66 (13 Jan 2021 04:00) (65 - 88)  BP: 140/77 (13 Jan 2021 04:00) (104/73 - 140/77)  BP(mean): --  ABP: --  ABP(mean): --  RR: 20 (13 Jan 2021 04:00) (18 - 20)  SpO2: 97% (13 Jan 2021 04:00) (97% - 100%)      CONSTITUTIONAL:  Well nourished.  NAD    ENT:   Airway patent,   Mouth with normal mucosa.   No thrush    EYES:   Pupils equal,   Round and reactive to light.    CARDIAC:   Normal rate,   Regular rhythm.    No edema      Vascular:  Normal systolic impulse  No Carotid bruits    RESPIRATORY:   No wheezing  Bilateral BS  Normal chest expansion  Not tachypneic,  No use of accessory muscles    GASTROINTESTINAL:  Abdomen soft,   Non-tender,   No guarding,   + BS    MUSCULOSKELETAL:   Range of motion is not limited,  No clubbing, cyanosis    NEUROLOGICAL:   Alert and oriented   No motor  deficits.    SKIN:   Skin normal color for race,   Warm and dry and intact.   No evidence of rash.    PSYCHIATRIC:   Normal mood and affect.   No apparent risk to self or others.    HEMATOLOGICAL:  No cervical  lymphadenopathy.  no inguinal lymphadenopathy      01-12-21 @ 07:01  -  01-13-21 @ 07:00  --------------------------------------------------------  IN:    Oral Fluid: 840 mL  Total IN: 840 mL    OUT:    Voided (mL): 1350 mL  Total OUT: 1350 mL    Total NET: -510 mL          LABS:                            13.8   9.76  )-----------( 303      ( 12 Jan 2021 04:30 )             41.2                                               01-12    139  |  102  |  22<H>  ----------------------------<  97  4.2   |  25  |  0.8    Ca    9.1      12 Jan 2021 04:30  Mg     2.4     01-12    TPro  6.4  /  Alb  3.8  /  TBili  0.3  /  DBili  x   /  AST  20  /  ALT  33  /  AlkPhos  91  01-12                                                                                           LIVER FUNCTIONS - ( 12 Jan 2021 04:30 )  Alb: 3.8 g/dL / Pro: 6.4 g/dL / ALK PHOS: 91 U/L / ALT: 33 U/L / AST: 20 U/L / GGT: x                                                                                                                                   ABG - ( 11 Jan 2021 10:35 )  pH, Arterial: 7.48  pH, Blood: x     /  pCO2: 34    /  pO2: 94    / HCO3: 26    / Base Excess: 2.6   /  SaO2: 98                  MEDICATIONS  (STANDING):  ALBUTerol    90 MICROgram(s) HFA Inhaler 2 Puff(s) Inhalation every 15 minutes  albuterol/ipratropium for Nebulization 3 milliLiter(s) Nebulizer every 6 hours  budesonide 160 MICROgram(s)/formoterol 4.5 MICROgram(s) Inhaler 2 Puff(s) Inhalation two times a day  chlorhexidine 4% Liquid 1 Application(s) Topical <User Schedule>  dexAMETHasone  Injectable 6 milliGRAM(s) IV Push daily  enoxaparin Injectable 40 milliGRAM(s) SubCutaneous every 12 hours  influenza   Vaccine 0.5 milliLiter(s) IntraMuscular once  LORazepam   Injectable 1 milliGRAM(s) IV Push once  pantoprazole    Tablet 40 milliGRAM(s) Oral before breakfast  tamsulosin 0.4 milliGRAM(s) Oral at bedtime  tiotropium 18 MICROgram(s) Capsule 1 Capsule(s) Inhalation daily    MEDICATIONS  (PRN):  LORazepam   Injectable 2 milliGRAM(s) IV Push every 8 hours PRN Anxiety  morphine  - Injectable 4 milliGRAM(s) IV Push every 4 hours PRN tachypnea.      New X-rays reviewed:                                                                                  ECHO    CXR interpreted by me:

## 2021-01-13 NOTE — PROGRESS NOTE ADULT - ASSESSMENT
60 year old Male former smoker(2 packs since age of 17, quit 4 years ago) with past medical history of COPD not on home oxygen presents with worsening Shortness of Breath for one week. Patient has shortness of breath secondary to COVID exacerbated by anxiety of prognosis.    Acute Hypoxic Respiratory Failure secondary to COVID 19 PNA  COPD/ex smoker  anxiety  saturating 97% on 2l NC at rest, dropped to 79% on 2L, with recovery  did not receive RDV/toci/plasma  continue with decadron, started 01/09  symbicort Q12H  continue with lovenox for DVT ppx  Ativan PRN for anxiety  pulm cc following  PT follow up    BPH  - Cont flomax    Patient can be downgraded to medical floor, anticipate dc home in 24-48 H, will likely require o2

## 2021-01-13 NOTE — PROGRESS NOTE ADULT - SUBJECTIVE AND OBJECTIVE BOX
HALI MIMS  60y, Male    All available historical data reviewed    OVERNIGHT EVENTS:  no fevers  [id number]    ROS:  General: Denies rigors, nightsweats  HEENT: Denies headache, rhinorrhea, sore throat, eye pain  CV: Denies CP, palpitations  PULM: Denies wheezing, hemoptysis  GI: Denies hematemesis, hematochezia, melena  : Denies discharge, hematuria  MSK: Denies arthralgias, myalgias  SKIN: Denies rash, lesions  NEURO: Denies paresthesias, weakness  PSYCH: Denies depression, anxiety    VITALS:  T(F): 97.2, Max: 97.3 (01-13-21 @ 04:00)  HR: 68  BP: 128/83  RR: 20Vital Signs Last 24 Hrs  T(C): 36.2 (13 Jan 2021 07:05), Max: 36.3 (13 Jan 2021 04:00)  T(F): 97.2 (13 Jan 2021 07:05), Max: 97.3 (13 Jan 2021 04:00)  HR: 68 (13 Jan 2021 07:05) (65 - 86)  BP: 128/83 (13 Jan 2021 07:05) (104/73 - 140/77)  BP(mean): --  RR: 20 (13 Jan 2021 07:05) (18 - 20)  SpO2: 97% (13 Jan 2021 09:09) (97% - 99%)    TESTS & MEASUREMENTS:                        14.1   7.17  )-----------( 312      ( 13 Jan 2021 04:30 )             41.9     01-13    138  |  102  |  18  ----------------------------<  110<H>  4.7   |  25  |  0.7    Ca    8.8      13 Jan 2021 04:30  Mg     2.3     01-13    TPro  6.9  /  Alb  3.7  /  TBili  0.6  /  DBili  x   /  AST  28  /  ALT  53<H>  /  AlkPhos  95  01-13    LIVER FUNCTIONS - ( 13 Jan 2021 04:30 )  Alb: 3.7 g/dL / Pro: 6.9 g/dL / ALK PHOS: 95 U/L / ALT: 53 U/L / AST: 28 U/L / GGT: x             Culture - Blood (collected 01-09-21 @ 18:20)  Source: .Blood Blood-Peripheral  Preliminary Report (01-11-21 @ 02:29):    No growth to date.            RADIOLOGY & ADDITIONAL TESTS:  Personal review of radiological diagnostics performed  Echo and EKG results noted when applicable.     MEDICATIONS:  acetaminophen   Tablet .. 650 milliGRAM(s) Oral every 6 hours PRN  ALBUTerol    90 MICROgram(s) HFA Inhaler 2 Puff(s) Inhalation every 15 minutes  albuterol/ipratropium for Nebulization 3 milliLiter(s) Nebulizer every 6 hours  budesonide 160 MICROgram(s)/formoterol 4.5 MICROgram(s) Inhaler 2 Puff(s) Inhalation two times a day  chlorhexidine 4% Liquid 1 Application(s) Topical <User Schedule>  dexAMETHasone  Injectable 6 milliGRAM(s) IV Push daily  enoxaparin Injectable 40 milliGRAM(s) SubCutaneous every 12 hours  influenza   Vaccine 0.5 milliLiter(s) IntraMuscular once  LORazepam   Injectable 2 milliGRAM(s) IV Push every 8 hours PRN  LORazepam   Injectable 1 milliGRAM(s) IV Push once  morphine  - Injectable 4 milliGRAM(s) IV Push every 4 hours PRN  pantoprazole    Tablet 40 milliGRAM(s) Oral before breakfast  tamsulosin 0.4 milliGRAM(s) Oral at bedtime  tiotropium 18 MICROgram(s) Capsule 1 Capsule(s) Inhalation daily      ANTIBIOTICS:

## 2021-01-13 NOTE — PROGRESS NOTE ADULT - SUBJECTIVE AND OBJECTIVE BOX
RICHJUDITHALI MCLEOD  60y Male    CHIEF COMPLAINT:    Patient is a 60y old  Male who presents with a chief complaint of Shortness of Breath (13 Jan 2021 10:47)      INTERVAL HPI/OVERNIGHT EVENTS:    Patient seen and examined. No acute events overnight. Overall inproved    ROS: All other systems are negative.    Vital Signs:    T(F): 97.2 (01-13-21 @ 07:05), Max: 97.3 (01-13-21 @ 04:00)  HR: 68 (01-13-21 @ 07:05) (65 - 86)  BP: 128/83 (01-13-21 @ 07:05) (104/73 - 140/77)  RR: 20 (01-13-21 @ 07:05) (18 - 20)  SpO2: 97% (01-13-21 @ 09:09) (97% - 99%)    12 Jan 2021 07:01  -  13 Jan 2021 07:00  --------------------------------------------------------  IN: 840 mL / OUT: 1350 mL / NET: -510 mL    PHYSICAL EXAM:    GENERAL:  NAD  SKIN: No rashes or lesions  HEENT: Atraumatic. Normocephalic.    NECK: Supple, No JVD.    PULMONARY: CTA B/L. No wheezing. No rales  CVS: Normal S1, S2. Rate and Rhythm are regular.    ABDOMEN/GI: Soft, Nontender, Nondistended; BS present  MSK:  No edema B/L LE. No clubbing or cyanosis  NEUROLOGIC: moves all extremities  PSYCH: Alert & oriented x 3, normal affect    Consultant(s) Notes Reviewed:  [x ] YES  [ ] NO  Care Discussed with Consultants/Other Providers [ x] YES  [ ] NO    LABS:                        14.1   7.17  )-----------( 312      ( 13 Jan 2021 04:30 )             41.9     138  |  102  |  18  ----------------------------<  110<H>  4.7   |  25  |  0.7    Ca    8.8      13 Jan 2021 04:30  Mg     2.3     01-13    TPro  6.9  /  Alb  3.7  /  TBili  0.6  /  DBili  x   /  AST  28  /  ALT  53<H>  /  AlkPhos  95  01-13    Serum Pro-Brain Natriuretic Peptide: 105 pg/mL (01-09-21 @ 18:22)  Serum Pro-Brain Natriuretic Peptide: 19 pg/mL (01-08-21 @ 16:50)    RADIOLOGY & ADDITIONAL TESTS:  Imaging or report Personally Reviewed:  [x] YES  [ ] NO  EKG reviewed: [x] YES  [ ] NO    Medications:  Standing  ALBUTerol    90 MICROgram(s) HFA Inhaler 2 Puff(s) Inhalation every 15 minutes  albuterol/ipratropium for Nebulization 3 milliLiter(s) Nebulizer every 6 hours  budesonide 160 MICROgram(s)/formoterol 4.5 MICROgram(s) Inhaler 2 Puff(s) Inhalation two times a day  chlorhexidine 4% Liquid 1 Application(s) Topical <User Schedule>  dexAMETHasone  Injectable 6 milliGRAM(s) IV Push daily  enoxaparin Injectable 40 milliGRAM(s) SubCutaneous every 12 hours  influenza   Vaccine 0.5 milliLiter(s) IntraMuscular once  LORazepam   Injectable 1 milliGRAM(s) IV Push once  pantoprazole    Tablet 40 milliGRAM(s) Oral before breakfast  tamsulosin 0.4 milliGRAM(s) Oral at bedtime  tiotropium 18 MICROgram(s) Capsule 1 Capsule(s) Inhalation daily    PRN Meds  acetaminophen   Tablet .. 650 milliGRAM(s) Oral every 6 hours PRN  LORazepam   Injectable 2 milliGRAM(s) IV Push every 8 hours PRN  morphine  - Injectable 4 milliGRAM(s) IV Push every 4 hours PRN

## 2021-01-13 NOTE — DISCHARGE NOTE PROVIDER - NSDCMRMEDTOKEN_GEN_ALL_CORE_FT
Albuterol (Eqv-ProAir HFA) 90 mcg/inh inhalation aerosol: 2 puff(s) inhaled every 6 hours, As Needed   Albuterol (Eqv-ProAir HFA) 90 mcg/inh inhalation aerosol: 2 puff(s) inhaled every 6 hours, As Needed for shortness of breath and/or wheezing  budesonide-formoterol 160 mcg-4.5 mcg/inh inhalation aerosol: 2 puff(s) inhaled 2 times a day   rivaroxaban 10 mg oral tablet: 1 tab(s) orally once a day   tamsulosin 0.4 mg oral capsule: 1 cap(s) orally once a day (at bedtime)  tiotropium 18 mcg inhalation capsule: 1 cap(s) inhaled once a day   Albuterol (Eqv-ProAir HFA) 90 mcg/inh inhalation aerosol: 2 puff(s) inhaled every 6 hours, As Needed for shortness of breath and/or wheezing  apixaban 5 mg oral tablet: 1 tab(s) orally 2 times a day   budesonide-formoterol 160 mcg-4.5 mcg/inh inhalation aerosol: 2 puff(s) inhaled 2 times a day   tamsulosin 0.4 mg oral capsule: 1 cap(s) orally once a day (at bedtime)  tiotropium 18 mcg inhalation capsule: 1 cap(s) inhaled once a day   Albuterol (Eqv-ProAir HFA) 90 mcg/inh inhalation aerosol: 2 puff(s) inhaled every 6 hours, As Needed for shortness of breath and/or wheezing  apixaban 2.5 mg oral tablet: 1 tab(s) orally 2 times a day   budesonide-formoterol 160 mcg-4.5 mcg/inh inhalation aerosol: 2 puff(s) inhaled 2 times a day   tamsulosin 0.4 mg oral capsule: 1 cap(s) orally once a day (at bedtime)  tiotropium 18 mcg inhalation capsule: 1 cap(s) inhaled once a day

## 2021-01-13 NOTE — DISCHARGE NOTE PROVIDER - NSDCCPCAREPLAN_GEN_ALL_CORE_FT
PRINCIPAL DISCHARGE DIAGNOSIS  Diagnosis: COVID-19  Assessment and Plan of Treatment: Coronavirus disease 2019 (COVID-19) is a respiratory illness  that can spread from person to person. The virus that causes  COVID-19 is a novel coronavirus that was first identified during  an investigation into an outbreak in [place], [place].  The virus that causes COVID-19 probably emerged from an  animal source, but is now spreading from person to person.  The virus is thought to spread mainly between people who  are in close contact with one another (within about 6 feet)  through respiratory droplets produced when an infected  person coughs or sneezes. It also may be possible that a person  can get COVID-19 by touching a surface or object that has  the virus on it and then touching their own mouth, nose, or  possibly their eyes, but this is not thought to be the main  way the virus spreads.  Please stay home and avoid contact with others for at least a week after symptoms resolve and follow government guidelines.   Patients with COVID-19 have had mild to severe respiratory  illness with symptoms of  • fever  • cough  • shortness of breath  People can help protect themselves from respiratory illness with  everyday preventive actions.    • Avoid close contact with people who are sick.  • Avoid touching your eyes, nose, and mouth with  unwashed hands.  • Wash your hands often with soap and water for at least 20   seconds. Use an alcohol-based hand  that contains at  least 60% alcohol if soap and water are not available.   Stay home when you are sick.  • Cover your cough or sneeze with a tissue, then throw the  tissue in the trash.  • Clean and disinfect frequently touched objects  and surfaces.  Call 911 and inform them you are covid positive before you decide to go to the emergency room if you have chest pain, difficulty breathing, high fevers, worsening of your symptoms, feel unwell, or have nausea and vomiting.      SECONDARY DISCHARGE DIAGNOSES  Diagnosis: SOB (shortness of breath)  Assessment and Plan of Treatment: The shortness of breath you experienced is because of covid-19 infection.     PRINCIPAL DISCHARGE DIAGNOSIS  Diagnosis: COVID-19  Assessment and Plan of Treatment: Coronavirus disease 2019 (COVID-19) is a respiratory illness  that can spread from person to person. The virus that causes  COVID-19 is a novel coronavirus that was first identified during  an investigation into an outbreak in [place], [place].  The virus that causes COVID-19 probably emerged from an  animal source, but is now spreading from person to person.  The virus is thought to spread mainly between people who  are in close contact with one another (within about 6 feet)  through respiratory droplets produced when an infected  person coughs or sneezes. It also may be possible that a person  can get COVID-19 by touching a surface or object that has  the virus on it and then touching their own mouth, nose, or  possibly their eyes, but this is not thought to be the main  way the virus spreads.  Please stay home and avoid contact with others for at least a week after symptoms resolve and follow government guidelines.   Patients with COVID-19 have had mild to severe respiratory  illness with symptoms of  • fever  • cough  • shortness of breath  People can help protect themselves from respiratory illness with  everyday preventive actions.    • Avoid close contact with people who are sick.  • Avoid touching your eyes, nose, and mouth with  unwashed hands.  • Wash your hands often with soap and water for at least 20   seconds. Use an alcohol-based hand  that contains at  least 60% alcohol if soap and water are not available.   Stay home when you are sick.  • Cover your cough or sneeze with a tissue, then throw the  tissue in the trash.  • Clean and disinfect frequently touched objects  and surfaces.  Call 911 and inform them you are covid positive before you decide to go to the emergency room if you have chest pain, difficulty breathing, high fevers, worsening of your symptoms, feel unwell, or have nausea and vomiting.      SECONDARY DISCHARGE DIAGNOSES  Diagnosis: Chronic obstructive pulmonary disease, unspecified COPD type  Assessment and Plan of Treatment: Chronic Obstructive Pulmonary Disease  Chronic obstructive pulmonary disease (COPD) is a lung condition in which airflow from the lungs is limited. Causes include smoking, secondhand smoke exposure, genetics, or recurrent infections. Take all medicines (inhaled or pills) as directed by your health care provider. Avoid exposure to irritants such as smoke, chemicals, and fumes that aggravate your breathing.  If you are a smoker, the most important thing that you can do is stop smoking. Continuing to smoke will cause further lung damage and breathing trouble. Ask your health care provider for help with quitting smoking.  SEEK IMMEDIATE MEDICAL CARE IF YOU HAVE ANY OF THE FOLLOWING SYMPTOMS: shortness of breath at rest or when talking, bluish discoloration of lips, skin, fever, worsening cough, unexplained chest pain, or lightheadedness/dizziness.      Diagnosis: SOB (shortness of breath)  Assessment and Plan of Treatment: The shortness of breath you experienced is because of covid-19 infection.

## 2021-01-13 NOTE — DISCHARGE NOTE PROVIDER - CARE PROVIDER_API CALL
Zachary Pollard  INTERNAL MEDICINE  [address] [place] [address]  Phone: [phone]  Fax: [phone]  Established Patient  Follow Up Time: 2 weeks

## 2021-01-13 NOTE — PHYSICAL THERAPY INITIAL EVALUATION ADULT - GENERAL OBSERVATIONS, REHAB EVAL
Chart reviewed, spoke with RN Sridevi, pt encountered supine, reports headache(7/10) and LBP(9/10) but agreeable, +2L NC, +tele, +BP cuff. IE completed 09:25-10:05

## 2021-01-13 NOTE — CHART NOTE - NSCHARTNOTEFT_GEN_A_CORE
DOWNGRADE TO FLOOR    HPI:  60 year old Male former smoker(2 packs since age of 17, quit 4 years ago) with past medical history of COPD not on home oxygen presents with worsening Shortness of Breath for one week.   Patient reports that since 27th he has been having intermittent fevers associated with dry cough, rigors, chills, loss of taste and smell. He tested positive on the 28th. For last one week he reports progressive shortness of breath, dry cough and increased inspiratory effort associated with sharp non radiating chest pain with deep inspiration. Patient also reports diarrhea for last 5 days watery, non bloody. He saw his PCP Dr. Pollard who referred him to ED. Patient denies any abdominal pain, hypoxia, numbness, tingling, dysuria or any other complaints.  In ED patient afebrile, hemodynamically stable, tachypneic to 30s placed on Bipap saturating 100% on 12/6/40%. CTA Chest negative for PE. In ED patient given one dose of Versed given to reduce the rate with improvement, Solumedrol 125, Magnesium and nebulizer treatments. At time of interview patient also anxious about prognosis and remains tachypneic.    4B course: Patient was on 4B when admitted. On 01/09 patient became tachypneic and was desating to the 80s on Bipap. ABGs showed ph 7.54, pco2 27, po2 556, lactate 1.9. Anesthesia called but intubation was held off d/t improvement in tachypnea. Patient evaluated by critical care team and patient approved for upgraded to ICU for further management.  ICU Course: While in ICU, pt tolerated HFNC and did not require any BiPAP or intubation. Pt seen by ID who recommended no ABX. For covid, pt is being treated with dexamethasone 6mg qd, but no need for RDV or CP at current time. Repeat inflammatory markers were ordered and collected. Pt also placed on duonebs and spiriva for underlying COPD. Finally, pt started on flomax for difficulty urinating (states he has h/o BPH)   CEU Course: pt was downgraded to CEU on 01/11. Patient remained stable on HFNC and during the course of his stay his oxygen requirements decreased. He is now on 2L NC and sating 98% at rest. Patient was considered to be discharged but on ambulation his saturation dropped to 79%. Patient is being downgraded to floor for observation. He is medically stable to be downgraded to floor.     Plan:  # Severe COVID-19 pna   - Febrile at home, afebrile in ED, tachypneic to 30s in ED  - Leukopenia present, D-dimer 369 COVID +ve  - CT shows bilateral ground-glass opacities (No PE)  - LE duplex -ve 1/9  - No ABX per ID  - Inflammatory markers: Ferritin: 365, CRP: 4.64>1.95, Procal: 0.07>0.04, D-dimer: 369>260  - on 2L NC, wean down oxygen requirements as tolerated   - c/w Dexa (since 1/9)  - Lovenox 40 BID  - c/w Ativan PRN (tachypneia/respiratory distress seem to have anxiety component)  - PT     # H/O COPD  - s/p 1 dose solumedrol  - off Abx (had been on kalen+vanco)  - No wheezing on exam   - c/w Duonebs and spiriva    DVT: Lovenox  Diet: DASH  GI: Protonix  Activity: as tolerated   Pending: PT   Disposition: anticipate for discharge home tomorrow DOWNGRADE TO FLOOR    HPI:  60 year old Male former smoker(2 packs since age of 17, quit 4 years ago) with past medical history of COPD not on home oxygen presents with worsening Shortness of Breath for one week.   Patient reports that since 27th he has been having intermittent fevers associated with dry cough, rigors, chills, loss of taste and smell. He tested positive on the 28th. For last one week he reports progressive shortness of breath, dry cough and increased inspiratory effort associated with sharp non radiating chest pain with deep inspiration. Patient also reports diarrhea for last 5 days watery, non bloody. He saw his PCP Dr. Pollard who referred him to ED. Patient denies any abdominal pain, hypoxia, numbness, tingling, dysuria or any other complaints.  In ED patient afebrile, hemodynamically stable, tachypneic to 30s placed on Bipap saturating 100% on 12/6/40%. CTA Chest negative for PE. In ED patient given one dose of Versed given to reduce the rate with improvement, Solumedrol 125, Magnesium and nebulizer treatments. At time of interview patient also anxious about prognosis and remains tachypneic.    4B course: Patient was on 4B when admitted. On 01/09 patient became tachypneic and was desating to the 80s on Bipap. ABGs showed ph 7.54, pco2 27, po2 556, lactate 1.9. Anesthesia called but intubation was held off d/t improvement in tachypnea. Patient evaluated by critical care team and patient approved for upgraded to ICU for further management.  ICU Course: While in ICU, pt tolerated HFNC and did not require any BiPAP or intubation. Pt seen by ID who recommended no ABX. For covid, pt is being treated with dexamethasone 6mg qd, but no need for RDV or CP at current time. Repeat inflammatory markers were ordered and collected. Pt also placed on duonebs and spiriva for underlying COPD. Finally, pt started on flomax for difficulty urinating (states he has h/o BPH)   CEU Course: pt was downgraded to CEU on 01/11. Patient remained stable on HFNC and during the course of his stay his oxygen requirements decreased. He is now on 2L NC and sating 98% at rest. Patient was considered to be discharged but on ambulation his saturation dropped to 79%. Patient is being downgraded to floor for observation. He is medically stable to be downgraded to floor.     Plan:  # Severe COVID-19 pna   - Febrile at home, afebrile in ED, tachypneic to 30s in ED  - Leukopenia present, D-dimer 369 COVID +ve  - CT shows bilateral ground-glass opacities (No PE)  - LE duplex -ve 1/9  - No ABX per ID  - Inflammatory markers: Ferritin: 365, CRP: 4.64>1.95, Procal: 0.07>0.04, D-dimer: 369>260  - on 2L NC, wean down oxygen requirements as tolerated   - c/w Dexa (since 1/9)  - Lovenox 40 BID  - c/w Ativan PRN (tachypneia/respiratory distress seem to have anxiety component)  - PT     # H/O COPD  - s/p 1 dose solumedrol  - off Abx (had been on kalen+vanco)  - No wheezing on exam   - c/w Duonebs and spiriva    DVT: Lovenox  Diet: DASH  GI: Protonix  Activity: as tolerated   Pending: PT   Disposition: anticipate for discharge tomorrow  Family discussion: spoke to the brother and updated him on the patient's condition. patient needs safe dispo planning as he can not go home while covid positive

## 2021-01-13 NOTE — PROGRESS NOTE ADULT - ASSESSMENT
60 year old Male former smoker(2 packs since age of 17, quit 4 years ago) with past medical history of COPD not on home oxygen presents with worsening Shortness of Breath for one week.     Patient reports that since 27th he has been having intermittent fevers associated with dry cough, rigors, chills, loss of taste and smell. He tested positive on the 28th. For last one week he reports progressive shortness of breath, dry cough and increased inspiratory effort associated with sharp non radiating chest pain with deep inspiration. Patient also reports diarrhea for last 5 days watery, non bloody.    IMPRESSION;  Clinically improved  COVID 19 with critical illness. 99%NC5LIT  Steroids beneficial.  Pt is in the late inflammatory response phase ot the illness based on the onset of symptoms.  procalcitonin 0.07 >0.03   Ferritin 365>518  CRP 4.6>2.2  Ddimers 369>260  CT chest : no PE, few opacities    RECOMMENDATIONS;  Dexamethasone 6 mg iv q24h for 10 days   Could finish course of steroids with po prednisone 40 mg q24 for a total of 10 days  Could transfer to [facility] for further management.   recall prn please

## 2021-01-14 ENCOUNTER — TRANSCRIPTION ENCOUNTER (OUTPATIENT)
Age: 61
End: 2021-01-14

## 2021-01-14 LAB
ALBUMIN SERPL ELPH-MCNC: 3.6 G/DL — SIGNIFICANT CHANGE UP (ref 3.5–5.2)
ALP SERPL-CCNC: 93 U/L — SIGNIFICANT CHANGE UP (ref 30–115)
ALT FLD-CCNC: 65 U/L — HIGH (ref 0–41)
ANION GAP SERPL CALC-SCNC: 9 MMOL/L — SIGNIFICANT CHANGE UP (ref 7–14)
AST SERPL-CCNC: 35 U/L — SIGNIFICANT CHANGE UP (ref 0–41)
BILIRUB SERPL-MCNC: 0.2 MG/DL — SIGNIFICANT CHANGE UP (ref 0.2–1.2)
BUN SERPL-MCNC: 20 MG/DL — SIGNIFICANT CHANGE UP (ref 10–20)
CALCIUM SERPL-MCNC: 8.9 MG/DL — SIGNIFICANT CHANGE UP (ref 8.5–10.1)
CHLORIDE SERPL-SCNC: 102 MMOL/L — SIGNIFICANT CHANGE UP (ref 98–110)
CO2 SERPL-SCNC: 27 MMOL/L — SIGNIFICANT CHANGE UP (ref 17–32)
CREAT SERPL-MCNC: 0.8 MG/DL — SIGNIFICANT CHANGE UP (ref 0.7–1.5)
GLUCOSE SERPL-MCNC: 97 MG/DL — SIGNIFICANT CHANGE UP (ref 70–99)
HCT VFR BLD CALC: 43.6 % — SIGNIFICANT CHANGE UP (ref 42–52)
HGB BLD-MCNC: 14.1 G/DL — SIGNIFICANT CHANGE UP (ref 14–18)
MAGNESIUM SERPL-MCNC: 2.2 MG/DL — SIGNIFICANT CHANGE UP (ref 1.8–2.4)
MCHC RBC-ENTMCNC: 28.1 PG — SIGNIFICANT CHANGE UP (ref 27–31)
MCHC RBC-ENTMCNC: 32.3 G/DL — SIGNIFICANT CHANGE UP (ref 32–37)
MCV RBC AUTO: 87 FL — SIGNIFICANT CHANGE UP (ref 80–94)
NRBC # BLD: 0 /100 WBCS — SIGNIFICANT CHANGE UP (ref 0–0)
PLATELET # BLD AUTO: 322 K/UL — SIGNIFICANT CHANGE UP (ref 130–400)
POTASSIUM SERPL-MCNC: 5.2 MMOL/L — HIGH (ref 3.5–5)
POTASSIUM SERPL-SCNC: 5.2 MMOL/L — HIGH (ref 3.5–5)
PROT SERPL-MCNC: 6.4 G/DL — SIGNIFICANT CHANGE UP (ref 6–8)
RBC # BLD: 5.01 M/UL — SIGNIFICANT CHANGE UP (ref 4.7–6.1)
RBC # FLD: 12.7 % — SIGNIFICANT CHANGE UP (ref 11.5–14.5)
SODIUM SERPL-SCNC: 138 MMOL/L — SIGNIFICANT CHANGE UP (ref 135–146)
WBC # BLD: 7.2 K/UL — SIGNIFICANT CHANGE UP (ref 4.8–10.8)
WBC # FLD AUTO: 7.2 K/UL — SIGNIFICANT CHANGE UP (ref 4.8–10.8)

## 2021-01-14 PROCEDURE — 99232 SBSQ HOSP IP/OBS MODERATE 35: CPT

## 2021-01-14 RX ORDER — SENNA PLUS 8.6 MG/1
2 TABLET ORAL AT BEDTIME
Refills: 0 | Status: DISCONTINUED | OUTPATIENT
Start: 2021-01-14 | End: 2021-01-22

## 2021-01-14 RX ORDER — POLYETHYLENE GLYCOL 3350 17 G/17G
17 POWDER, FOR SOLUTION ORAL ONCE
Refills: 0 | Status: COMPLETED | OUTPATIENT
Start: 2021-01-14 | End: 2021-01-15

## 2021-01-14 RX ORDER — ALBUTEROL 90 UG/1
1 AEROSOL, METERED ORAL ONCE
Refills: 0 | Status: COMPLETED | OUTPATIENT
Start: 2021-01-14 | End: 2021-01-15

## 2021-01-14 RX ADMIN — ENOXAPARIN SODIUM 40 MILLIGRAM(S): 100 INJECTION SUBCUTANEOUS at 04:39

## 2021-01-14 RX ADMIN — Medication 6 MILLIGRAM(S): at 04:39

## 2021-01-14 RX ADMIN — Medication 3 MILLILITER(S): at 08:44

## 2021-01-14 RX ADMIN — BUDESONIDE AND FORMOTEROL FUMARATE DIHYDRATE 2 PUFF(S): 160; 4.5 AEROSOL RESPIRATORY (INHALATION) at 21:20

## 2021-01-14 RX ADMIN — Medication 2 MILLIGRAM(S): at 16:39

## 2021-01-14 RX ADMIN — ENOXAPARIN SODIUM 40 MILLIGRAM(S): 100 INJECTION SUBCUTANEOUS at 17:24

## 2021-01-14 RX ADMIN — SENNA PLUS 2 TABLET(S): 8.6 TABLET ORAL at 21:20

## 2021-01-14 RX ADMIN — PANTOPRAZOLE SODIUM 40 MILLIGRAM(S): 20 TABLET, DELAYED RELEASE ORAL at 04:39

## 2021-01-14 RX ADMIN — TAMSULOSIN HYDROCHLORIDE 0.4 MILLIGRAM(S): 0.4 CAPSULE ORAL at 21:19

## 2021-01-14 NOTE — PROGRESS NOTE ADULT - ASSESSMENT
61yo male former smoker (2 packs since age of 17, quit 4 years ago) with past medical history of COPD (not on home oxygen) presented with worsening shortness of breath for one week. Patient originally tested positive for COVID on 12/28. She presented back to the hospital on 1/28 for worsening SOB. Course complicated by ICU upgrade secondary to acute desaturation on BiPAP. She was transitioned to HFNC and was subsequently downgraded to CEU. She was transitioned to nasal cannula and was subsequently downgraded to floors.       Severe COVID-19 PNA   - Tachypnea present on admission  - Leukopenia present on admission, D-dimer 369, COVID positive  - CTA: bilateral ground-glass opacities (No PE)  - LE duplex (1/9): negative 1/9  - No ABX per ID  - Inflammatory markers: Ferritin: 365, CRP: 4.64>1.95, Procal: 0.07>0.04, D-dimer: 369>260  - Saturating 95% on 2L NC, wean down oxygen requirements as tolerated   - C/w Dexamethasone 6mg IV daily for 10 days (started 1/9)  - Lovenox 40mg BID  - C/w Ativan PRN (tachypnea/respiratory distress seem to have anxiety component)  - PT consult      H/o COPD  - S/p 1 dose solumedrol  - Off abx (had been on kalen+vanco)  - No wheezing on exam   - C/w Duonebs and spiriva      Misc  DVT: Lovenox  Diet: DASH  GI: Protonix  Activity: as tolerated   Pending: PT   Disposition: anticipate for discharge tomorrow  Family discussion: spoke to the brother and updated him on the patient's condition. patient needs safe dispo planning as he can not go home while covid positive.   61yo male former smoker (2 packs since age of 17, quit 4 years ago) with past medical history of COPD (not on home oxygen) presented with worsening shortness of breath for one week. Patient originally tested positive for COVID on 12/28. She presented back to the hospital on 1/28 for worsening SOB. Course complicated by ICU upgrade secondary to acute desaturation on BiPAP. She was transitioned to HFNC and was subsequently downgraded to CEU. She was transitioned to nasal cannula and was subsequently downgraded to floors.       Severe COVID-19 PNA   - Tachypnea present on admission  - Leukopenia present on admission, D-dimer 369, COVID positive  - CTA: bilateral ground-glass opacities (No PE)  - LE duplex (1/9): negative 1/9  - No ABX per ID  - Inflammatory markers: Ferritin: 365, CRP: 4.64>1.95, Procal: 0.07>0.04, D-dimer: 369>260  - Saturating 95% on 2L NC, wean down oxygen requirements as tolerated   - C/w Dexamethasone 6mg IV daily for 10 days (started 1/9)  - Lovenox 40mg BID  - C/w Ativan PRN (tachypnea/respiratory distress seem to have anxiety component)  - PT consult      H/o COPD  - S/p 1 dose solumedrol  - Off abx (had been on kalen+vanco)  - No wheezing on exam   - C/w Duonebs and spiriva      Misc  DVT: Lovenox  Diet: DASH  GI: Protonix  Activity: as tolerated   Pending: PT   Disposition: possible transfer [facility] tomorrow, will reswab for COVID today  Family discussion: spoke to the brother and updated him on the patient's condition. patient needs safe dispo planning as he can not go home while covid positive.

## 2021-01-14 NOTE — PROGRESS NOTE ADULT - SUBJECTIVE AND OBJECTIVE BOX
Patient is a 60y old Male who presents with a chief complaint of Shortness of Breath (13 Jan 2021 14:48)    No acute events overnight. Patient has no complaints this morning. Denies chest pain, N/V/D.    PAST MEDICAL & SURGICAL HISTORY:  No pertinent past medical history        PHYSICAL EXAM  Vital Signs Last 24 Hrs  T(C): 36.7 (14 Jan 2021 05:11), Max: 36.7 (14 Jan 2021 05:11)  T(F): 98 (14 Jan 2021 05:11), Max: 98 (14 Jan 2021 05:11)  HR: 76 (14 Jan 2021 05:11) (70 - 79)  BP: 110/70 (14 Jan 2021 05:11) (101/65 - 123/79)  BP(mean): --  RR: 19 (14 Jan 2021 05:11) (19 - 20)  SpO2: 95% (14 Jan 2021 05:11) (95% - 99%)    I&O's Summary    13 Jan 2021 07:01  -  14 Jan 2021 07:00  --------------------------------------------------------  IN: 120 mL / OUT: 1000 mL / NET: -880 mL      GENERAL:  NAD  SKIN: No rashes or lesions  HEENT: Atraumatic. Normocephalic.    NECK: Supple, No JVD.    PULMONARY: CTA B/L. No wheezing. No rales  CVS: Normal S1, S2. Rate and Rhythm are regular.    ABDOMEN/GI: Soft, Nontender, Nondistended; BS present  MSK:  No edema B/L LE. No clubbing or cyanosis  NEUROLOGIC: moves all extremities  PSYCH: Alert & oriented x 3, normal affect      LABS                        14.1   7.20  )-----------( 322      ( 14 Jan 2021 04:30 )             43.6     Hemoglobin: 14.1 g/dL (01-14 @ 04:30)  Hemoglobin: 14.1 g/dL (01-13 @ 04:30)  Hemoglobin: 13.8 g/dL (01-12 @ 04:30)  Hemoglobin: 13.8 g/dL (01-11 @ 04:30)  Hemoglobin: 14.2 g/dL (01-10 @ 04:30)    CBC Full  -  ( 14 Jan 2021 04:30 )  WBC Count : 7.20 K/uL  RBC Count : 5.01 M/uL  Hemoglobin : 14.1 g/dL  Hematocrit : 43.6 %  Platelet Count - Automated : 322 K/uL  Mean Cell Volume : 87.0 fL  Mean Cell Hemoglobin : 28.1 pg  Mean Cell Hemoglobin Concentration : 32.3 g/dL  Auto Neutrophil # : x  Auto Lymphocyte # : x  Auto Monocyte # : x  Auto Eosinophil # : x  Auto Basophil # : x  Auto Neutrophil % : x  Auto Lymphocyte % : x  Auto Monocyte % : x  Auto Eosinophil % : x  Auto Basophil % : x    01-13    138  |  102  |  18  ----------------------------<  110<H>  4.7   |  25  |  0.7    Ca    8.8      13 Jan 2021 04:30  Mg     2.3     01-13    TPro  6.9  /  Alb  3.7  /  TBili  0.6  /  DBili  x   /  AST  28  /  ALT  53<H>  /  AlkPhos  95  01-13    Creatinine Trend: 0.7<--, 0.8<--, 0.8<--, 0.8<--, 0.8<--, 0.8<--  LIVER FUNCTIONS - ( 13 Jan 2021 04:30 )  Alb: 3.7 g/dL / Pro: 6.9 g/dL / ALK PHOS: 95 U/L / ALT: 53 U/L / AST: 28 U/L / GGT: x                             EKG:   MICROBIOLOGY:    IMAGING:

## 2021-01-14 NOTE — CHART NOTE - NSCHARTNOTEFT_GEN_A_CORE
60 year old Male former smoker(2 packs since age of 17, quit 4 years ago) with past medical history of COPD not on home oxygen admitted for worsening Shortness of Breath for one week. Patient has shortness of breath secondary to COVID.    Patient arrived to [facility],  stable. c/o mild sob. O2 96% on 2L NC    Vital Signs Last 24 Hrs  T(C): 36.6 (14 Jan 2021 14:47), Max: 36.7 (14 Jan 2021 05:11)  T(F): 97.9 (14 Jan 2021 14:47), Max: 98 (14 Jan 2021 05:11)  HR: 91 (14 Jan 2021 14:47) (60 - 91)  BP: 112/78 (14 Jan 2021 14:47) (101/65 - 123/79)  BP(mean): --  RR: 22 (14 Jan 2021 14:47) (18 - 22)  SpO2: 96% (14 Jan 2021 14:47) (94% - 99%)    CONSTITUTIONAL: pt appears mildly anxious but in no apparent distress.   EYES: PERRL; EOM intact.   ENT: normal nose; no rhinorrhea; normal pharynx with no tonsillar hypertrophy.   NECK: Supple; non-tender; no cervical lymphadenopathy. No JVD.   CARDIOVASCULAR: Normal S1, S2; no murmurs, rubs, or gallops.   RESPIRATORY: On 2 L NC Normal chest excursion with respiration; breath sounds clear and equal bilaterally; no wheezes, rhonchi, or rales.  GI/: Normal bowel sounds; non-distended; non-tender; no palpable organomegaly.   MS: No evidence of trauma or deformity.  Normal ROM in all four extremities; non-tender to palpation; distal pulses are normal.   Extrem: no peripheral edema. No calf ttp  SKIN: Normal for age and race; warm; dry; good turgor; no apparent lesions or exudate.   NEURO/PSYCH: A & O x 4; grossly unremarkable. mood and manner are appropriate.

## 2021-01-14 NOTE — PROGRESS NOTE ADULT - ASSESSMENT
60 year old Male former smoker(2 packs since age of 17, quit 4 years ago) with past medical history of COPD not on home oxygen presents with worsening Shortness of Breath for one week. Patient has shortness of breath secondary to COVID exacerbated by anxiety of prognosis.    Acute Hypoxic Respiratory Failure secondary to COVID 19 PNA  COPD/ex smoker  anxiety  saturating 95- 97% on 2l NC at rest, not tachypnea, desaturated on ambulation yesterday  did not receive RDV/toci/plasma  continue with decadron, started 01/09  symbicort Q12H  continue with lovenox for DVT ppx  Ativan PRN for anxiety  PT follow up today, check o2 on ambulation    BPH  - Cont flomax    Patient will be transferred to [facility], Dr Reed aware/accepts  Patient in agreement, requesting repeat covid test when ready for dc as he lives with an immunocompromised brother and afraid of infecting him (he tested + on 12/28 so he is done with quarantine which i explained to him, so the likelihood that he is infectious at this point is low)

## 2021-01-14 NOTE — PROGRESS NOTE ADULT - SUBJECTIVE AND OBJECTIVE BOX
JOHNNIE MIMSIAN  60y Male    CHIEF COMPLAINT:    Patient is a 60y old  Male who presents with a chief complaint of Shortness of Breath (14 Jan 2021 08:56)      INTERVAL HPI/OVERNIGHT EVENTS:    Patient seen and examined. No acute events overnight. Complains of weakness and fatigue today    ROS: All other systems are negative.    Vital Signs:    T(F): 98 (01-14-21 @ 05:11), Max: 98 (01-14-21 @ 05:11)  HR: 76 (01-14-21 @ 05:11) (70 - 79)  BP: 110/70 (01-14-21 @ 05:11) (101/65 - 123/79)  RR: 19 (01-14-21 @ 05:11) (19 - 20)  SpO2: 95% (01-14-21 @ 05:11) (95% - 99%)    13 Jan 2021 07:01  -  14 Jan 2021 07:00  --------------------------------------------------------  IN: 120 mL / OUT: 1000 mL / NET: -880 mL    PHYSICAL EXAM:    GENERAL:  NAD  SKIN: No rashes or lesions  HEENT: Atraumatic. Normocephalic.   NECK: Supple, No JVD.   PULMONARY: CTA B/L. No wheezing. No rales  CVS: Normal S1, S2. Rate and Rhythm are regular.    ABDOMEN/GI: Soft, Nontender, Nondistended; BS present  MSK:  No edema B/L LE. No clubbing or cyanosis  NEUROLOGIC: Moves all extremities  PSYCH: Alert & oriented x 3, normal affect    Consultant(s) Notes Reviewed:  [x ] YES  [ ] NO  Care Discussed with Consultants/Other Providers [ x] YES  [ ] NO    LABS:                        14.1   7.20  )-----------( 322      ( 14 Jan 2021 04:30 )             43.6     138  |  102  |  20  ----------------------------<  97  5.2<H>   |  27  |  0.8    Ca    8.9      14 Jan 2021 04:30  Mg     2.2     01-14    TPro  6.4  /  Alb  3.6  /  TBili  0.2  /  DBili  x   /  AST  35  /  ALT  65<H>  /  AlkPhos  93  01-14    Serum Pro-Brain Natriuretic Peptide: 105 pg/mL (01-09-21 @ 18:22)  Serum Pro-Brain Natriuretic Peptide: 19 pg/mL (01-08-21 @ 16:50)    RADIOLOGY & ADDITIONAL TESTS:  Imaging or report Personally Reviewed:  [x] YES  [ ] NO  EKG reviewed: [x] YES  [ ] NO    Medications:  Standing  ALBUTerol    90 MICROgram(s) HFA Inhaler 2 Puff(s) Inhalation every 15 minutes  albuterol/ipratropium for Nebulization 3 milliLiter(s) Nebulizer every 6 hours  budesonide 160 MICROgram(s)/formoterol 4.5 MICROgram(s) Inhaler 2 Puff(s) Inhalation two times a day  chlorhexidine 4% Liquid 1 Application(s) Topical <User Schedule>  dexAMETHasone  Injectable 6 milliGRAM(s) IV Push daily  enoxaparin Injectable 40 milliGRAM(s) SubCutaneous every 12 hours  influenza   Vaccine 0.5 milliLiter(s) IntraMuscular once  LORazepam   Injectable 1 milliGRAM(s) IV Push once  pantoprazole    Tablet 40 milliGRAM(s) Oral before breakfast  tamsulosin 0.4 milliGRAM(s) Oral at bedtime  tiotropium 18 MICROgram(s) Capsule 1 Capsule(s) Inhalation daily    PRN Meds  acetaminophen   Tablet .. 650 milliGRAM(s) Oral every 6 hours PRN  LORazepam   Injectable 2 milliGRAM(s) IV Push every 8 hours PRN  morphine  - Injectable 4 milliGRAM(s) IV Push every 4 hours PRN

## 2021-01-15 LAB
CULTURE RESULTS: SIGNIFICANT CHANGE UP
SPECIMEN SOURCE: SIGNIFICANT CHANGE UP

## 2021-01-15 RX ORDER — CARBAMIDE PEROXIDE 81.86 MG/ML
1 SOLUTION/ DROPS AURICULAR (OTIC)
Refills: 0 | Status: COMPLETED | OUTPATIENT
Start: 2021-01-15 | End: 2021-01-20

## 2021-01-15 RX ORDER — IPRATROPIUM/ALBUTEROL SULFATE 18-103MCG
3 AEROSOL WITH ADAPTER (GRAM) INHALATION ONCE
Refills: 0 | Status: COMPLETED | OUTPATIENT
Start: 2021-01-15 | End: 2021-01-15

## 2021-01-15 RX ADMIN — ENOXAPARIN SODIUM 40 MILLIGRAM(S): 100 INJECTION SUBCUTANEOUS at 17:36

## 2021-01-15 RX ADMIN — Medication 2 MILLIGRAM(S): at 12:39

## 2021-01-15 RX ADMIN — ALBUTEROL 1 PUFF(S): 90 AEROSOL, METERED ORAL at 12:34

## 2021-01-15 RX ADMIN — BUDESONIDE AND FORMOTEROL FUMARATE DIHYDRATE 2 PUFF(S): 160; 4.5 AEROSOL RESPIRATORY (INHALATION) at 21:13

## 2021-01-15 RX ADMIN — POLYETHYLENE GLYCOL 3350 17 GRAM(S): 17 POWDER, FOR SOLUTION ORAL at 12:32

## 2021-01-15 RX ADMIN — Medication 6 MILLIGRAM(S): at 05:36

## 2021-01-15 RX ADMIN — TAMSULOSIN HYDROCHLORIDE 0.4 MILLIGRAM(S): 0.4 CAPSULE ORAL at 21:49

## 2021-01-15 RX ADMIN — PANTOPRAZOLE SODIUM 40 MILLIGRAM(S): 20 TABLET, DELAYED RELEASE ORAL at 05:38

## 2021-01-15 RX ADMIN — ALBUTEROL 2 PUFF(S): 90 AEROSOL, METERED ORAL at 12:35

## 2021-01-15 RX ADMIN — Medication 3 MILLILITER(S): at 13:45

## 2021-01-15 RX ADMIN — CHLORHEXIDINE GLUCONATE 1 APPLICATION(S): 213 SOLUTION TOPICAL at 05:39

## 2021-01-15 RX ADMIN — Medication 650 MILLIGRAM(S): at 05:45

## 2021-01-15 RX ADMIN — CARBAMIDE PEROXIDE 1 DROP(S): 81.86 SOLUTION/ DROPS AURICULAR (OTIC) at 22:11

## 2021-01-15 RX ADMIN — Medication 3 MILLILITER(S): at 21:13

## 2021-01-15 RX ADMIN — ENOXAPARIN SODIUM 40 MILLIGRAM(S): 100 INJECTION SUBCUTANEOUS at 05:38

## 2021-01-15 NOTE — PROGRESS NOTE ADULT - SUBJECTIVE AND OBJECTIVE BOX
Name: HALI MIMS  Age: 60y  Gender: Male    HOD 7  Pt was seen and examined. No acute events overnight. Transferred from [place], stable on 2L NC.       Allergies:  No Known Allergies      PHYSICAL EXAM:    Vitals:  Vital Signs Last 24 Hrs  T(C): 36.6 (15 Hari 2021 04:53), Max: 36.9 (14 Jan 2021 20:55)  T(F): 97.9 (15 Hari 2021 04:53), Max: 98.5 (14 Jan 2021 20:55)  HR: 67 (15 Hari 2021 04:53) (60 - 91)  BP: 100/64 (15 Hari 2021 04:53) (97/65 - 120/65)  RR: 20 (15 Hari 2021 04:53) (18 - 22)  SpO2: 95% (15 Hari 2021 04:53) (92% - 99%)    GENERAL: NAD  CHEST/LUNG: CTAB  HEART: S1,S2 RRR  ABDOMEN: Soft, NT/ND, +BS  EXTREMITIES: no LE edema      LABS:                        14.1   7.20  )-----------( 322      ( 14 Jan 2021 04:30 )             43.6     01-14    138  |  102  |  20  ----------------------------<  97  5.2<H>   |  27  |  0.8    Ca    8.9      14 Jan 2021 04:30  Mg     2.2     01-14    TPro  6.4  /  Alb  3.6  /  TBili  0.2  /  DBili  x   /  AST  35  /  ALT  65<H>  /  AlkPhos  93  01-14    LIVER FUNCTIONS - ( 14 Jan 2021 04:30 )  Alb: 3.6 g/dL / Pro: 6.4 g/dL / ALK PHOS: 93 U/L / ALT: 65 U/L / AST: 35 U/L / GGT: x                 MEDICATIONS  (STANDING):  ALBUTerol    90 MICROgram(s) HFA Inhaler 1 Puff(s) Inhalation once  ALBUTerol    90 MICROgram(s) HFA Inhaler 2 Puff(s) Inhalation every 15 minutes  albuterol/ipratropium for Nebulization 3 milliLiter(s) Nebulizer every 6 hours  budesonide 160 MICROgram(s)/formoterol 4.5 MICROgram(s) Inhaler 2 Puff(s) Inhalation two times a day  chlorhexidine 4% Liquid 1 Application(s) Topical <User Schedule>  dexAMETHasone  Injectable 6 milliGRAM(s) IV Push daily  enoxaparin Injectable 40 milliGRAM(s) SubCutaneous every 12 hours  influenza   Vaccine 0.5 milliLiter(s) IntraMuscular once  LORazepam   Injectable 1 milliGRAM(s) IV Push once  pantoprazole    Tablet 40 milliGRAM(s) Oral before breakfast  polyethylene glycol 3350 17 Gram(s) Oral once  senna 2 Tablet(s) Oral at bedtime  tamsulosin 0.4 milliGRAM(s) Oral at bedtime  tiotropium 18 MICROgram(s) Capsule 1 Capsule(s) Inhalation daily        RADIOLOGY & ADDITIONAL TESTS:    Imaging Personally Reviewed:  [x] YES  [ ] NO Name: HALI MIMS  Age: 60y  Gender: Male    HOD 7  Pt was seen and examined. No acute events overnight. Transferred from [place], was increased to 4L NC because pt feeling SOB. Wheezing appreciated b/l lungs.       Allergies:  No Known Allergies      PHYSICAL EXAM:    Vitals:  Vital Signs Last 24 Hrs  T(C): 36.6 (15 Hari 2021 04:53), Max: 36.9 (14 Jan 2021 20:55)  T(F): 97.9 (15 Hari 2021 04:53), Max: 98.5 (14 Jan 2021 20:55)  HR: 67 (15 Hari 2021 04:53) (60 - 91)  BP: 100/64 (15 Hari 2021 04:53) (97/65 - 120/65)  RR: 20 (15 Hari 2021 04:53) (18 - 22)  SpO2: 95% (15 Hari 2021 04:53) (92% - 99%)    GENERAL: NAD  CHEST/LUNG: wheezing noted b/l lungs  HEART: S1,S2 RRR  ABDOMEN: Soft, NT/ND, +BS  EXTREMITIES: no LE edema      LABS:                        14.1   7.20  )-----------( 322      ( 14 Jan 2021 04:30 )             43.6     01-14    138  |  102  |  20  ----------------------------<  97  5.2<H>   |  27  |  0.8    Ca    8.9      14 Jan 2021 04:30  Mg     2.2     01-14    TPro  6.4  /  Alb  3.6  /  TBili  0.2  /  DBili  x   /  AST  35  /  ALT  65<H>  /  AlkPhos  93  01-14    LIVER FUNCTIONS - ( 14 Jan 2021 04:30 )  Alb: 3.6 g/dL / Pro: 6.4 g/dL / ALK PHOS: 93 U/L / ALT: 65 U/L / AST: 35 U/L / GGT: x                 MEDICATIONS  (STANDING):  ALBUTerol    90 MICROgram(s) HFA Inhaler 1 Puff(s) Inhalation once  ALBUTerol    90 MICROgram(s) HFA Inhaler 2 Puff(s) Inhalation every 15 minutes  albuterol/ipratropium for Nebulization 3 milliLiter(s) Nebulizer every 6 hours  budesonide 160 MICROgram(s)/formoterol 4.5 MICROgram(s) Inhaler 2 Puff(s) Inhalation two times a day  chlorhexidine 4% Liquid 1 Application(s) Topical <User Schedule>  dexAMETHasone  Injectable 6 milliGRAM(s) IV Push daily  enoxaparin Injectable 40 milliGRAM(s) SubCutaneous every 12 hours  influenza   Vaccine 0.5 milliLiter(s) IntraMuscular once  LORazepam   Injectable 1 milliGRAM(s) IV Push once  pantoprazole    Tablet 40 milliGRAM(s) Oral before breakfast  polyethylene glycol 3350 17 Gram(s) Oral once  senna 2 Tablet(s) Oral at bedtime  tamsulosin 0.4 milliGRAM(s) Oral at bedtime  tiotropium 18 MICROgram(s) Capsule 1 Capsule(s) Inhalation daily        RADIOLOGY & ADDITIONAL TESTS:    Imaging Personally Reviewed:  [x] YES  [ ] NO

## 2021-01-15 NOTE — DIETITIAN INITIAL EVALUATION ADULT. - NAME AND PHONE
Nutrition Interventions: meals and snacks, medical food supplements RD to monitor diet order, energy intake, body composition, NFPF , electrolyte profile

## 2021-01-15 NOTE — DIETITIAN INITIAL EVALUATION ADULT. - PERSON TAUGHT/METHOD
brother/Discussed use of oral nutrition supplements to optimize po intake. Food preferences include life cereal with milk, gatorade, blueberry/banana, meaty dishes./verbal instruction

## 2021-01-15 NOTE — DIETITIAN INITIAL EVALUATION ADULT. - OTHER INFO
59 yo M with PMH COPD admitted for SOB, COVID (+) c/b ICU upgrade d/t desat and BIPAP, downgraded to CEU and now transferred for [facility].     Nutrition hx obtained from pt's brother via phone number listed in EMR. Reports at baseline pt was a good eater, 3 meals/day. Denies use of oral nutrition supplements, vitamins, or minerals. Confirms NKFA. No Denominational or cultural food preferences. Denies difficulties chewing or swallowing.     In house appetite seems variable, % reported per RN flow sheets. RN today new to pt, unsure of po intake. No c/o N+V, constipation or diarrhea. LBM 1/13     -228lbs went down to 195lbs d/t intentional dietary changes to lose wt but since being in quarantine, regained some wt back.     Ht:   71"  Wt:  213.4lbs   IBW:  172lbs +/-10%    BMI: 29.8  kg/m2      Skin: No pressure injuries per RN flow sheets  Edema: None noted per RN flow sheets

## 2021-01-15 NOTE — PROGRESS NOTE ADULT - ASSESSMENT
61yo male former smoker (2 packs since age of 17, quit 4 years ago) with past medical history of COPD (not on home oxygen) presented with worsening shortness of breath for one week. Patient originally tested positive for COVID on 12/28. He presented back to the hospital on 1/28 for worsening SOB. Course complicated by ICU upgrade secondary to acute desaturation on BiPAP. He was transitioned to HFNC and was subsequently downgraded to CEU. He was transitioned to nasal cannula and was subsequently downgraded to floors.     Severe COVID-19 PNA   - Tachypnea present on admission  - Leukopenia present on admission, D-dimer 369, COVID positive  - CTA: bilateral ground-glass opacities (No PE)  - LE duplex (1/9): negative 1/9  - No ABX per ID  - Inflammatory markers: Ferritin: 365, CRP: 4.64>1.95, Procal: 0.07>0.04, D-dimer: 369>260  - Saturating 95% on 2L NC, wean down oxygen requirements as tolerated   - C/w Dexamethasone 6mg IV daily for 10 days (started 1/9)  - Lovenox 40mg BID  - C/w Ativan PRN (tachypnea/respiratory distress seem to have anxiety component)  - PT consult    H/o COPD  - S/p 1 dose solumedrol  - Off abx (had been on kalen+vanco)  - No wheezing on exam   - C/w Duonebs and spiriva      Misc  DVT: Lovenox  Diet: DASH  GI: Protonix  Activity: as tolerated   Pending: PT     Disposition: patient needs safe dispo planning as he can not go home while covid positive. 1/16 next swab  Will discuss plan with patient and CM.      59yo male former smoker (2 packs since age of 17, quit 4 years ago) with past medical history of COPD (not on home oxygen) presented with worsening shortness of breath for one week. Patient originally tested positive for COVID on 12/28. He presented back to the hospital on 1/28 for worsening SOB. Course complicated by ICU upgrade secondary to acute desaturation on BiPAP. He was transitioned to HFNC and was subsequently downgraded to CEU. He was transitioned to nasal cannula and was subsequently downgraded to floors.     Severe COVID-19 PNA   - Tachypnea present on admission  - Leukopenia present on admission, D-dimer 369, COVID positive  - CTA: bilateral ground-glass opacities (No PE)  - LE duplex (1/9): negative 1/9  - No ABX per ID  - Inflammatory markers: Ferritin: 365, CRP: 4.64>1.95, Procal: 0.07>0.04, D-dimer: 369>260  - Saturating 95% on 2L NC, wean down oxygen requirements as tolerated   - C/w Dexamethasone 6mg IV daily for 10 days (started 1/9)  - Lovenox 40mg BID  - C/w Ativan PRN (tachypnea/respiratory distress seem to have anxiety component)  - PT consult    H/o COPD  - S/p 1 dose solumedrol  - Off abx (had been on kalen+vanco)  - C/w Duonebs and spiriva  - Pt o2 sat stable at 95% on 3L but feeling SOB   - wheezing appreciated this AM on exam , duonebs have not been given- reordered for STAT dose  - Will continue to monitor      Misc  DVT: Lovenox  Diet: DASH  GI: Protonix  Activity: as tolerated   Pending: PT     Disposition: patient needs safe dispo planning as he can not go home while covid positive. 1/16 next swab  Will discuss plan with patient and CM.

## 2021-01-15 NOTE — DIETITIAN INITIAL EVALUATION ADULT. - OTHER CALCULATIONS
wt used: 1995-2176kcal (MSJ x 1.1-1.2 AF) protein: 78-94g (1-1.2g/kg IBW) -- bmi borderline 30 fluids: 1ml/kcal or per LIP

## 2021-01-15 NOTE — DIETITIAN INITIAL EVALUATION ADULT. - ADD RECOMMEND
1. Continue current diet order as tolerated 2. Order Ensure Enlive BID 3. Order Prosource gelatin plus BID

## 2021-01-16 DIAGNOSIS — R06.02 SHORTNESS OF BREATH: ICD-10-CM

## 2021-01-16 LAB
ANION GAP SERPL CALC-SCNC: 9 MMOL/L — SIGNIFICANT CHANGE UP (ref 7–14)
BUN SERPL-MCNC: 21 MG/DL — HIGH (ref 10–20)
CALCIUM SERPL-MCNC: 8.8 MG/DL — SIGNIFICANT CHANGE UP (ref 8.5–10.1)
CHLORIDE SERPL-SCNC: 102 MMOL/L — SIGNIFICANT CHANGE UP (ref 98–110)
CO2 SERPL-SCNC: 25 MMOL/L — SIGNIFICANT CHANGE UP (ref 17–32)
CREAT SERPL-MCNC: 0.8 MG/DL — SIGNIFICANT CHANGE UP (ref 0.7–1.5)
GLUCOSE SERPL-MCNC: 90 MG/DL — SIGNIFICANT CHANGE UP (ref 70–99)
POTASSIUM SERPL-MCNC: 4.9 MMOL/L — SIGNIFICANT CHANGE UP (ref 3.5–5)
POTASSIUM SERPL-SCNC: 4.9 MMOL/L — SIGNIFICANT CHANGE UP (ref 3.5–5)
SODIUM SERPL-SCNC: 136 MMOL/L — SIGNIFICANT CHANGE UP (ref 135–146)

## 2021-01-16 RX ORDER — ACETAMINOPHEN 500 MG
650 TABLET ORAL EVERY 6 HOURS
Refills: 0 | Status: DISCONTINUED | OUTPATIENT
Start: 2021-01-16 | End: 2021-01-22

## 2021-01-16 RX ORDER — HYDROXYZINE HCL 10 MG
25 TABLET ORAL EVERY 6 HOURS
Refills: 0 | Status: DISCONTINUED | OUTPATIENT
Start: 2021-01-16 | End: 2021-01-22

## 2021-01-16 RX ADMIN — Medication 650 MILLIGRAM(S): at 17:34

## 2021-01-16 RX ADMIN — ENOXAPARIN SODIUM 40 MILLIGRAM(S): 100 INJECTION SUBCUTANEOUS at 05:21

## 2021-01-16 RX ADMIN — CARBAMIDE PEROXIDE 1 DROP(S): 81.86 SOLUTION/ DROPS AURICULAR (OTIC) at 17:34

## 2021-01-16 RX ADMIN — TAMSULOSIN HYDROCHLORIDE 0.4 MILLIGRAM(S): 0.4 CAPSULE ORAL at 21:32

## 2021-01-16 RX ADMIN — ENOXAPARIN SODIUM 40 MILLIGRAM(S): 100 INJECTION SUBCUTANEOUS at 17:35

## 2021-01-16 RX ADMIN — Medication 3 MILLILITER(S): at 02:33

## 2021-01-16 RX ADMIN — CHLORHEXIDINE GLUCONATE 1 APPLICATION(S): 213 SOLUTION TOPICAL at 05:21

## 2021-01-16 RX ADMIN — BUDESONIDE AND FORMOTEROL FUMARATE DIHYDRATE 2 PUFF(S): 160; 4.5 AEROSOL RESPIRATORY (INHALATION) at 09:06

## 2021-01-16 RX ADMIN — CARBAMIDE PEROXIDE 1 DROP(S): 81.86 SOLUTION/ DROPS AURICULAR (OTIC) at 05:20

## 2021-01-16 RX ADMIN — BUDESONIDE AND FORMOTEROL FUMARATE DIHYDRATE 2 PUFF(S): 160; 4.5 AEROSOL RESPIRATORY (INHALATION) at 21:33

## 2021-01-16 RX ADMIN — Medication 6 MILLIGRAM(S): at 05:18

## 2021-01-16 RX ADMIN — PANTOPRAZOLE SODIUM 40 MILLIGRAM(S): 20 TABLET, DELAYED RELEASE ORAL at 09:06

## 2021-01-16 NOTE — PROGRESS NOTE ADULT - SUBJECTIVE AND OBJECTIVE BOX
Pain Management Progress Note -     60y Male former smoker(2 packs since age of 17, quit 4 years ago) with past medical history of COPD not on home oxygen. Presented  to the ED with worsening Shortness of Breath for one week.     Noted the patient to be panting with O2 at 4L NC. Patient stated that he is having trouble taking a deep breath. Patient stated he took his a nebulizer treatment earlier and it helped his breathing. Patient is on Dexamethasone. Patient encouraged to continue deep breathing.     O2 increased to 5L NC. Patient coached to control his breathing and take deep slow breaths. Patient doing so. Breathing is now more controlled after controlled breathing and taking pumps. Will monitor patients breathing. Last COVID test Positive on 1/8/21.      No Known Allergies      COVID 19 SOB    ^COVID + TROUBLE BREATHING    Handoff    MEWS Score    No pertinent past medical history    COVID-19    COVID + TROUBLE BREATHING    4    SOB (shortness of breath)    [email or web address]        carbamide peroxide Otic Solution  albuterol/ipratropium for Nebulization.  senna  acetaminophen   Tablet ..  pantoprazole    Tablet  tamsulosin  budesonide 160 MICROgram(s)/formoterol 4.5 MICROgram(s) Inhaler  morphine  - Injectable  LORazepam   Injectable  dexAMETHasone  Injectable  enoxaparin Injectable  LORazepam   Injectable  influenza   Vaccine  tiotropium 18 MICROgram(s) Capsule  albuterol/ipratropium for Nebulization  chlorhexidine 4% Liquid      acetaminophen   Tablet .. 650 milliGRAM(s) Oral every 6 hours PRN  albuterol/ipratropium for Nebulization 3 milliLiter(s) Nebulizer every 6 hours  albuterol/ipratropium for Nebulization. 3 milliLiter(s) Nebulizer once  budesonide 160 MICROgram(s)/formoterol 4.5 MICROgram(s) Inhaler 2 Puff(s) Inhalation two times a day  carbamide peroxide Otic Solution 1 Drop(s) Both Ears two times a day  chlorhexidine 4% Liquid 1 Application(s) Topical <User Schedule>  dexAMETHasone  Injectable 6 milliGRAM(s) IV Push daily  enoxaparin Injectable 40 milliGRAM(s) SubCutaneous every 12 hours  influenza   Vaccine 0.5 milliLiter(s) IntraMuscular once  LORazepam   Injectable 2 milliGRAM(s) IV Push every 8 hours PRN  LORazepam   Injectable 1 milliGRAM(s) IV Push once  morphine  - Injectable 4 milliGRAM(s) IV Push every 4 hours PRN  pantoprazole    Tablet 40 milliGRAM(s) Oral before breakfast  senna 2 Tablet(s) Oral at bedtime  tamsulosin 0.4 milliGRAM(s) Oral at bedtime  tiotropium 18 MICROgram(s) Capsule 1 Capsule(s) Inhalation daily      01-16 @ 02:0098 mL/min/1.73M2    T(C): 36.9 (01-16-21 @ 05:17), Max: 36.9 (01-16-21 @ 05:17)  HR: 77 (01-16-21 @ 05:17) (77 - 85)  BP: 125/77 (01-16-21 @ 05:17) (96/61 - 125/77)  RR: 19 (01-16-21 @ 05:17) (19 - 21)  SpO2: 97% (01-16-21 @ 05:17) (94% - 97%)  Wt(kg): --     REVIEW OF SYSTEMS    General:	NAD   Skin/Breast:	Neg  Ophthalmologic:	Neg  ENMT: Neg	  Respiratory and Thorax: Positive COVID with SOB	  Cardiovascular:	Neg  Gastrointestinal:	Neg  Genitourinary:	Neg  Musculoskeletal:	Neg  Neurological:	Neg  Psychiatric:	Anxiety  Hematology/Lymphatics:	Neg  Endocrine:	Neg        PHYSICAL EXAM:    GENERAL: NAD, well-groomed, well-developed  HEAD:  Atraumatic, Normocephalic  EYES: EOMI, PERRLA, conjunctiva and sclera clear  ENMT: No tonsillar erythema, exudates, or enlargement; Moist mucous membranes, Good dentition, No lesions  NECK: Supple, No JVD, Normal thyroid  NERVOUS SYSTEM:  Alert & Oriented X3, Good concentration; Motor Strength 5/5 B/L upper and lower extremities  CHEST/LUNG: Breath sounds present, Mild SOB, O2 5L NL in place  HEART: Regular rate and rhythm; No murmurs, rubs, or gallops  ABDOMEN: Soft, Nontender, Nondistended; Bowel sounds present  EXTREMITIES:  2+ Peripheral Pulses, No clubbing, cyanosis, or edema  LYMPH: No lymphadenopathy noted  SKIN: No rashes or lesions

## 2021-01-16 NOTE — CHART NOTE - NSCHARTNOTEFT_GEN_A_CORE
Spoke with the patient's brother, Chon. I informed of the events this am. He asked about discharge. I informed him that once the breathing is stable, discharge planning will be evaluated. Mr. Givens verbalized an understanding and stated he would drop off some items for the patient today.

## 2021-01-16 NOTE — PROGRESS NOTE ADULT - PROBLEM SELECTOR PLAN 1
1. Change acetaminophen   Tablet .. 650 milliGRAM(s) Oral every 6 hours Standing  2. DC LORazepam   Injectable 2 milliGRAM(s) IV Push every 8 hours PRN  3. Start Lorazepam 1mg PO Q8hrs PRN   4. DC morphine  - Injectable 4 milliGRAM(s) IV Push every 4 hours PRN  5. Start Tramadol 50mg PO Q6hrs PRN for moderate to severe pain  6. Start Incentive Spirometer 10x/day

## 2021-01-17 LAB — SARS-COV-2 RNA SPEC QL NAA+PROBE: SIGNIFICANT CHANGE UP

## 2021-01-17 RX ADMIN — Medication 650 MILLIGRAM(S): at 11:39

## 2021-01-17 RX ADMIN — Medication 650 MILLIGRAM(S): at 05:29

## 2021-01-17 RX ADMIN — CARBAMIDE PEROXIDE 1 DROP(S): 81.86 SOLUTION/ DROPS AURICULAR (OTIC) at 05:30

## 2021-01-17 RX ADMIN — CHLORHEXIDINE GLUCONATE 1 APPLICATION(S): 213 SOLUTION TOPICAL at 05:34

## 2021-01-17 RX ADMIN — ENOXAPARIN SODIUM 40 MILLIGRAM(S): 100 INJECTION SUBCUTANEOUS at 17:19

## 2021-01-17 RX ADMIN — Medication 6 MILLIGRAM(S): at 05:29

## 2021-01-17 RX ADMIN — BUDESONIDE AND FORMOTEROL FUMARATE DIHYDRATE 2 PUFF(S): 160; 4.5 AEROSOL RESPIRATORY (INHALATION) at 07:45

## 2021-01-17 RX ADMIN — PANTOPRAZOLE SODIUM 40 MILLIGRAM(S): 20 TABLET, DELAYED RELEASE ORAL at 05:29

## 2021-01-17 RX ADMIN — ENOXAPARIN SODIUM 40 MILLIGRAM(S): 100 INJECTION SUBCUTANEOUS at 05:30

## 2021-01-17 RX ADMIN — Medication 650 MILLIGRAM(S): at 17:18

## 2021-01-17 RX ADMIN — TAMSULOSIN HYDROCHLORIDE 0.4 MILLIGRAM(S): 0.4 CAPSULE ORAL at 21:48

## 2021-01-17 RX ADMIN — SENNA PLUS 2 TABLET(S): 8.6 TABLET ORAL at 21:48

## 2021-01-17 RX ADMIN — CARBAMIDE PEROXIDE 1 DROP(S): 81.86 SOLUTION/ DROPS AURICULAR (OTIC) at 17:18

## 2021-01-17 NOTE — PROGRESS NOTE ADULT - ASSESSMENT
59yo male former smoker (2 packs since age of 17, quit 4 years ago) with past medical history of COPD (not on home oxygen) presented with worsening shortness of breath for one week. Patient originally tested positive for COVID on 12/28. He presented back to the hospital on 1/28 for worsening SOB. Course complicated by ICU upgrade secondary to acute desaturation on BiPAP. He was transitioned to HFNC and was subsequently downgraded to CEU. He was transitioned to nasal cannula and was subsequently downgraded to floors.     Severe COVID-19 PNA   - Tachypnea present on admission  - Leukopenia present on admission, D-dimer 369, COVID positive  - CTA: bilateral ground-glass opacities (No PE)  - LE duplex (1/9): negative 1/9  - No ABX per ID  - Inflammatory markers: Ferritin: 365, CRP: 4.64>1.95, Procal: 0.07>0.04, D-dimer: 369>260  - Saturating 95% on 2L NC, wean down oxygen requirements as tolerated   - C/w Dexamethasone 6mg IV daily for 10 days (started 1/9)  - Lovenox 40mg BID  - C/w Ativan PRN (tachypnea/respiratory distress seem to have anxiety component)  - PT consult    H/o COPD  - S/p 1 dose solumedrol  - Off abx (had been on kalen+vanco)  - C/w Duonebs and spiriva  - Pt o2 sat stable at 95% on 3L but feeling SOB   - wheezing appreciated this AM on exam , duonebs have not been given- reordered for STAT dose  - Will continue to monitor      Misc  DVT: Lovenox  Diet: DASH  GI: Protonix  Activity: as tolerated   Pending: PT     Disposition: Possibly home w/o2   1/16 covid negative 61yo male former smoker (2 packs since age of 17, quit 4 years ago) with past medical history of COPD (not on home oxygen) presented with worsening shortness of breath for one week. Patient originally tested positive for COVID on 12/28. He presented back to the hospital on 1/28 for worsening SOB. Course complicated by ICU upgrade secondary to acute desaturation on BiPAP. He was transitioned to HFNC and was subsequently downgraded to CEU. He was transitioned to nasal cannula and was subsequently downgraded to floors.     Severe COVID-19 PNA   - Tachypnea present on admission  - Leukopenia present on admission, D-dimer 369, COVID positive  - CTA: bilateral ground-glass opacities (No PE)  - LE duplex (1/9): negative 1/9  - No ABX per ID  - Inflammatory markers: Ferritin: 365, CRP: 4.64>1.95, Procal: 0.07>0.04, D-dimer: 369>260  - Saturating 95% on 2L NC, wean down oxygen requirements as tolerated   - C/w Dexamethasone 6mg IV daily for 10 days (started 1/9)  - Lovenox 40mg BID  - C/w Ativan PRN (tachypnea/respiratory distress seem to have anxiety component)  - PT consult  - incentive spirometer     H/o COPD  - S/p 1 dose solumedrol  - Off abx (had been on kalen+vanco)  - C/w Duonebs and spiriva  - Pt o2 sat stable at 95% on 3L but feeling SOB   - wheezing appreciated this AM on exam , duonebs have not been given- reordered for STAT dose  - Will continue to monitor      Misc  DVT: Lovenox  Diet: DASH  GI: Protonix  Activity: as tolerated   Pending: PT     Disposition: Possibly home w/o2   1/16 covid negative

## 2021-01-17 NOTE — PROGRESS NOTE ADULT - SUBJECTIVE AND OBJECTIVE BOX
Name: HALI MIMS  Age: 60y  Gender: Male        HOD 9  Pt was seen and examined. No acute events overnight.      Allergies:  No Known Allergies      PHYSICAL EXAM:    Vitals:  Vital Signs Last 24 Hrs  T(C): 37.1 (17 Jan 2021 04:26), Max: 37.1 (17 Jan 2021 04:26)  T(F): 98.8 (17 Jan 2021 04:26), Max: 98.8 (17 Jan 2021 04:26)  HR: 83 (17 Jan 2021 04:26) (83 - 103)  BP: 101/66 (17 Jan 2021 04:26) (101/66 - 115/78)  BP(mean): --  RR: 19 (16 Jan 2021 20:43) (19 - 20)  SpO2: 95% (17 Jan 2021 04:26) (95% - 97%)    GENERAL: NAD  CHEST/LUNG: CTAB  HEART: S1,S2 RRR  ABDOMEN: Soft, NT/ND, +BS  EXTREMITIES: no LE edema      LABS:    01-16    136  |  102  |  21<H>  ----------------------------<  90  4.9   |  25  |  0.8    Ca    8.8      16 Jan 2021 02:00              MEDICATIONS  (STANDING):  acetaminophen   Tablet .. 650 milliGRAM(s) Oral every 6 hours  albuterol/ipratropium for Nebulization 3 milliLiter(s) Nebulizer every 6 hours  albuterol/ipratropium for Nebulization. 3 milliLiter(s) Nebulizer once  budesonide 160 MICROgram(s)/formoterol 4.5 MICROgram(s) Inhaler 2 Puff(s) Inhalation two times a day  carbamide peroxide Otic Solution 1 Drop(s) Both Ears two times a day  chlorhexidine 4% Liquid 1 Application(s) Topical <User Schedule>  dexAMETHasone  Injectable 6 milliGRAM(s) IV Push daily  enoxaparin Injectable 40 milliGRAM(s) SubCutaneous every 12 hours  influenza   Vaccine 0.5 milliLiter(s) IntraMuscular once  pantoprazole    Tablet 40 milliGRAM(s) Oral before breakfast  senna 2 Tablet(s) Oral at bedtime  tamsulosin 0.4 milliGRAM(s) Oral at bedtime  tiotropium 18 MICROgram(s) Capsule 1 Capsule(s) Inhalation daily        RADIOLOGY & ADDITIONAL TESTS:    Imaging Personally Reviewed:  [x] YES  [ ] NO Name: HALI MIMS  Age: 60y  Gender: Male        HOD 9  Pt was seen and examined. No acute events overnight.  Feels better this AM but still finds it hard to catch his breath.      Allergies:  No Known Allergies      PHYSICAL EXAM:    Vitals:  Vital Signs Last 24 Hrs  T(C): 37.1 (17 Jan 2021 04:26), Max: 37.1 (17 Jan 2021 04:26)  T(F): 98.8 (17 Jan 2021 04:26), Max: 98.8 (17 Jan 2021 04:26)  HR: 83 (17 Jan 2021 04:26) (83 - 103)  BP: 101/66 (17 Jan 2021 04:26) (101/66 - 115/78)  BP(mean): --  RR: 19 (16 Jan 2021 20:43) (19 - 20)  SpO2: 95% (17 Jan 2021 04:26) (95% - 97%)    GENERAL: NAD  CHEST/LUNG: CTAB  HEART: S1,S2 RRR  ABDOMEN: Soft, NT/ND, +BS  EXTREMITIES: no LE edema      LABS:    01-16    136  |  102  |  21<H>  ----------------------------<  90  4.9   |  25  |  0.8    Ca    8.8      16 Jan 2021 02:00              MEDICATIONS  (STANDING):  acetaminophen   Tablet .. 650 milliGRAM(s) Oral every 6 hours  albuterol/ipratropium for Nebulization 3 milliLiter(s) Nebulizer every 6 hours  albuterol/ipratropium for Nebulization. 3 milliLiter(s) Nebulizer once  budesonide 160 MICROgram(s)/formoterol 4.5 MICROgram(s) Inhaler 2 Puff(s) Inhalation two times a day  carbamide peroxide Otic Solution 1 Drop(s) Both Ears two times a day  chlorhexidine 4% Liquid 1 Application(s) Topical <User Schedule>  dexAMETHasone  Injectable 6 milliGRAM(s) IV Push daily  enoxaparin Injectable 40 milliGRAM(s) SubCutaneous every 12 hours  influenza   Vaccine 0.5 milliLiter(s) IntraMuscular once  pantoprazole    Tablet 40 milliGRAM(s) Oral before breakfast  senna 2 Tablet(s) Oral at bedtime  tamsulosin 0.4 milliGRAM(s) Oral at bedtime  tiotropium 18 MICROgram(s) Capsule 1 Capsule(s) Inhalation daily        RADIOLOGY & ADDITIONAL TESTS:    Imaging Personally Reviewed:  [x] YES  [ ] NO

## 2021-01-17 NOTE — PROGRESS NOTE ADULT - ATTENDING COMMENTS
Seen on team rounds with PA
pt seen on rounds with PA  dyspneic on minimal exertion  continue O2 supplementation

## 2021-01-17 NOTE — CHART NOTE - NSCHARTNOTEFT_GEN_A_CORE
Pt seen at the bedside.  States that he started getting bilateral calf pain Right > Left after returning from walk to bathroom.  Lungs clear bilaterally. Heart RRR, S1S2.  No chest pain.  SOB at baseline.  (+) bilateral calf tenderness Right > Left.  Pedal pulses 2+ bilaterally.  No edema noted. Venous Duplex Bilateral Lower Extremities ordered. Repeat Routine labs in AM.

## 2021-01-18 LAB
ALBUMIN SERPL ELPH-MCNC: 3.7 G/DL — SIGNIFICANT CHANGE UP (ref 3.5–5.2)
ALP SERPL-CCNC: 75 U/L — SIGNIFICANT CHANGE UP (ref 30–115)
ALT FLD-CCNC: 115 U/L — HIGH (ref 0–41)
ANION GAP SERPL CALC-SCNC: 16 MMOL/L — HIGH (ref 7–14)
AST SERPL-CCNC: 36 U/L — SIGNIFICANT CHANGE UP (ref 0–41)
BILIRUB SERPL-MCNC: <0.2 MG/DL — SIGNIFICANT CHANGE UP (ref 0.2–1.2)
BUN SERPL-MCNC: 25 MG/DL — HIGH (ref 10–20)
CALCIUM SERPL-MCNC: 9.2 MG/DL — SIGNIFICANT CHANGE UP (ref 8.5–10.1)
CHLORIDE SERPL-SCNC: 102 MMOL/L — SIGNIFICANT CHANGE UP (ref 98–110)
CO2 SERPL-SCNC: 18 MMOL/L — SIGNIFICANT CHANGE UP (ref 17–32)
CREAT SERPL-MCNC: 0.8 MG/DL — SIGNIFICANT CHANGE UP (ref 0.7–1.5)
GLUCOSE SERPL-MCNC: 93 MG/DL — SIGNIFICANT CHANGE UP (ref 70–99)
HCT VFR BLD CALC: 39.3 % — LOW (ref 42–52)
HGB BLD-MCNC: 12.7 G/DL — LOW (ref 14–18)
MAGNESIUM SERPL-MCNC: 2.3 MG/DL — SIGNIFICANT CHANGE UP (ref 1.8–2.4)
MCHC RBC-ENTMCNC: 28.3 PG — SIGNIFICANT CHANGE UP (ref 27–31)
MCHC RBC-ENTMCNC: 32.3 G/DL — SIGNIFICANT CHANGE UP (ref 32–37)
MCV RBC AUTO: 87.5 FL — SIGNIFICANT CHANGE UP (ref 80–94)
NRBC # BLD: 0 /100 WBCS — SIGNIFICANT CHANGE UP (ref 0–0)
PLATELET # BLD AUTO: 341 K/UL — SIGNIFICANT CHANGE UP (ref 130–400)
POTASSIUM SERPL-MCNC: 5.7 MMOL/L — HIGH (ref 3.5–5)
POTASSIUM SERPL-SCNC: 5.7 MMOL/L — HIGH (ref 3.5–5)
PROT SERPL-MCNC: 6.1 G/DL — SIGNIFICANT CHANGE UP (ref 6–8)
RBC # BLD: 4.49 M/UL — LOW (ref 4.7–6.1)
RBC # FLD: 13.1 % — SIGNIFICANT CHANGE UP (ref 11.5–14.5)
SODIUM SERPL-SCNC: 136 MMOL/L — SIGNIFICANT CHANGE UP (ref 135–146)
WBC # BLD: 14.66 K/UL — HIGH (ref 4.8–10.8)
WBC # FLD AUTO: 14.66 K/UL — HIGH (ref 4.8–10.8)

## 2021-01-18 PROCEDURE — 93970 EXTREMITY STUDY: CPT | Mod: 26

## 2021-01-18 RX ADMIN — CARBAMIDE PEROXIDE 1 DROP(S): 81.86 SOLUTION/ DROPS AURICULAR (OTIC) at 05:23

## 2021-01-18 RX ADMIN — BUDESONIDE AND FORMOTEROL FUMARATE DIHYDRATE 2 PUFF(S): 160; 4.5 AEROSOL RESPIRATORY (INHALATION) at 21:55

## 2021-01-18 RX ADMIN — ENOXAPARIN SODIUM 40 MILLIGRAM(S): 100 INJECTION SUBCUTANEOUS at 05:21

## 2021-01-18 RX ADMIN — Medication 650 MILLIGRAM(S): at 05:21

## 2021-01-18 RX ADMIN — Medication 6 MILLIGRAM(S): at 05:21

## 2021-01-18 RX ADMIN — ENOXAPARIN SODIUM 40 MILLIGRAM(S): 100 INJECTION SUBCUTANEOUS at 17:16

## 2021-01-18 RX ADMIN — Medication 650 MILLIGRAM(S): at 17:15

## 2021-01-18 RX ADMIN — SENNA PLUS 2 TABLET(S): 8.6 TABLET ORAL at 21:54

## 2021-01-18 RX ADMIN — BUDESONIDE AND FORMOTEROL FUMARATE DIHYDRATE 2 PUFF(S): 160; 4.5 AEROSOL RESPIRATORY (INHALATION) at 02:06

## 2021-01-18 RX ADMIN — Medication 1 MILLIGRAM(S): at 06:04

## 2021-01-18 RX ADMIN — TAMSULOSIN HYDROCHLORIDE 0.4 MILLIGRAM(S): 0.4 CAPSULE ORAL at 21:54

## 2021-01-18 RX ADMIN — Medication 1 MILLIGRAM(S): at 22:02

## 2021-01-18 RX ADMIN — PANTOPRAZOLE SODIUM 40 MILLIGRAM(S): 20 TABLET, DELAYED RELEASE ORAL at 05:21

## 2021-01-18 RX ADMIN — CHLORHEXIDINE GLUCONATE 1 APPLICATION(S): 213 SOLUTION TOPICAL at 05:07

## 2021-01-18 RX ADMIN — Medication 650 MILLIGRAM(S): at 12:07

## 2021-01-18 RX ADMIN — CARBAMIDE PEROXIDE 1 DROP(S): 81.86 SOLUTION/ DROPS AURICULAR (OTIC) at 17:15

## 2021-01-18 NOTE — PROGRESS NOTE ADULT - SUBJECTIVE AND OBJECTIVE BOX
OVERNIGHT EVENTS:  No acute events overnight.    SUBJECTIVE / INTERVAL HPI: Patient seen and examined at bedside. C/o left leg/calf pain. Otherwise no complaints.       VITAL SIGNS:  Vital Signs Last 24 Hrs  T(C): 36.7 (18 Jan 2021 09:32), Max: 36.7 (18 Jan 2021 09:32)  T(F): 98.1 (18 Jan 2021 09:32), Max: 98.1 (18 Jan 2021 09:32)  HR: 74 (18 Jan 2021 09:32) (73 - 80)  BP: 120/72 (18 Jan 2021 09:32) (100/59 - 122/75)  RR: 18 (18 Jan 2021 09:32) (18 - 18)  SpO2: 97% (18 Jan 2021 09:32) (94% - 98%)    PHYSICAL EXAM:  GENERAL: NAD  CHEST/LUNG: CTAB  HEART: S1,S2 RRR  ABDOMEN: Soft, NT/ND, +BS  EXTREMITIES: right calf tenderness, no swelling or redness. Faint LE distal pulses bilaterally    MEDICATIONS:  MEDICATIONS  (STANDING):  acetaminophen   Tablet .. 650 milliGRAM(s) Oral every 6 hours  albuterol/ipratropium for Nebulization 3 milliLiter(s) Nebulizer every 6 hours  budesonide 160 MICROgram(s)/formoterol 4.5 MICROgram(s) Inhaler 2 Puff(s) Inhalation two times a day  carbamide peroxide Otic Solution 1 Drop(s) Both Ears two times a day  chlorhexidine 4% Liquid 1 Application(s) Topical <User Schedule>  dexAMETHasone  Injectable 6 milliGRAM(s) IV Push daily  enoxaparin Injectable 40 milliGRAM(s) SubCutaneous every 12 hours  influenza   Vaccine 0.5 milliLiter(s) IntraMuscular once  pantoprazole    Tablet 40 milliGRAM(s) Oral before breakfast  senna 2 Tablet(s) Oral at bedtime  tamsulosin 0.4 milliGRAM(s) Oral at bedtime  tiotropium 18 MICROgram(s) Capsule 1 Capsule(s) Inhalation daily    MEDICATIONS  (PRN):  hydrOXYzine hydrochloride 25 milliGRAM(s) Oral every 6 hours PRN Anxiety  LORazepam     Tablet 1 milliGRAM(s) Oral every 8 hours PRN Anxiety      ALLERGIES:  Allergies    No Known Allergies    Intolerances        LABS:                        12.7   14.66 )-----------( 341      ( 18 Jan 2021 02:12 )             39.3     01-18    136  |  102  |  25<H>  ----------------------------<  93  5.7<H>   |  18  |  0.8    Ca    9.2      18 Jan 2021 02:12  Mg     2.3     01-18    TPro  6.1  /  Alb  3.7  /  TBili  <0.2  /  DBili  x   /  AST  36  /  ALT  115<H>  /  AlkPhos  75  01-18        CAPILLARY BLOOD GLUCOSE          RADIOLOGY & ADDITIONAL TESTS:  LE duplex 1/18- negative for DVT

## 2021-01-18 NOTE — PROGRESS NOTE ADULT - ASSESSMENT
59yo male former smoker (2 packs since age of 17, quit 4 years ago) with past medical history of COPD (not on home oxygen) presented with worsening shortness of breath for one week. Patient originally tested positive for COVID on 12/28. He presented back to the hospital for worsening SOB. Course complicated by ICU upgrade secondary to acute desaturation on BiPAP. He was transitioned to HFNC and was subsequently downgraded to CEU. He was transitioned to nasal cannula and was subsequently downgraded to floors.     # COVID PNA  - Tachypnea present on admission  - Leukopenia present on admission, D-dimer 369, COVID positive  - CTA: bilateral ground-glass opacities (No PE)  - LE duplex: negative for DVT 1/9 and 1/18  - No ABX per ID  - Inflammatory markers: Ferritin: 365, CRP: 4.64>1.95, Procal: 0.07>0.04, D-dimer: 369>260  - Saturating 97% on 3L NC, wean down oxygen requirements as tolerated   - C/w Dexamethasone 6mg IV daily for 10 days (started 1/9)  - Lovenox 40mg BID  - C/w Ativan PRN (tachypnea/respiratory distress seem to have anxiety component)  - PT consult  - incentive spirometer     # COPD  - S/p 1 dose solumedrol  - Off abx (had been on kalen+vanco)  - C/w Duonebs and spiriva  - Pt o2 sat stable at 97% on 3L but feeling SOB   - wheezing appreciated this AM on exam , duonebs have not been given- reordered for STAT dose  - Will continue to monitor    # L leg pain  -started after walking to bathroom on 1/17; improving but persistent today. No redness or swelling  -LE duplex negative for DVT  -f/u creatine kinase  -tylenol PRN    Misc  DVT: Lovenox  Diet: DASH  GI: Protonix  Activity: as tolerated   Pending: PT     Disposition: Possibly home w/o2   1/16 covid negative

## 2021-01-19 LAB
ANION GAP SERPL CALC-SCNC: 14 MMOL/L — SIGNIFICANT CHANGE UP (ref 7–14)
BUN SERPL-MCNC: 24 MG/DL — HIGH (ref 10–20)
CALCIUM SERPL-MCNC: 8.7 MG/DL — SIGNIFICANT CHANGE UP (ref 8.5–10.1)
CHLORIDE SERPL-SCNC: 102 MMOL/L — SIGNIFICANT CHANGE UP (ref 98–110)
CK SERPL-CCNC: 79 U/L — SIGNIFICANT CHANGE UP (ref 0–225)
CO2 SERPL-SCNC: 21 MMOL/L — SIGNIFICANT CHANGE UP (ref 17–32)
CREAT SERPL-MCNC: 0.8 MG/DL — SIGNIFICANT CHANGE UP (ref 0.7–1.5)
GLUCOSE SERPL-MCNC: 105 MG/DL — HIGH (ref 70–99)
HCT VFR BLD CALC: 38.6 % — LOW (ref 42–52)
HGB BLD-MCNC: 12.8 G/DL — LOW (ref 14–18)
MAGNESIUM SERPL-MCNC: 2.3 MG/DL — SIGNIFICANT CHANGE UP (ref 1.8–2.4)
MCHC RBC-ENTMCNC: 28.9 PG — SIGNIFICANT CHANGE UP (ref 27–31)
MCHC RBC-ENTMCNC: 33.2 G/DL — SIGNIFICANT CHANGE UP (ref 32–37)
MCV RBC AUTO: 87.1 FL — SIGNIFICANT CHANGE UP (ref 80–94)
NRBC # BLD: 0 /100 WBCS — SIGNIFICANT CHANGE UP (ref 0–0)
PLATELET # BLD AUTO: 315 K/UL — SIGNIFICANT CHANGE UP (ref 130–400)
POTASSIUM SERPL-MCNC: 5 MMOL/L — SIGNIFICANT CHANGE UP (ref 3.5–5)
POTASSIUM SERPL-SCNC: 5 MMOL/L — SIGNIFICANT CHANGE UP (ref 3.5–5)
RBC # BLD: 4.43 M/UL — LOW (ref 4.7–6.1)
RBC # FLD: 13.3 % — SIGNIFICANT CHANGE UP (ref 11.5–14.5)
SARS-COV-2 RNA SPEC QL NAA+PROBE: DETECTED
SODIUM SERPL-SCNC: 137 MMOL/L — SIGNIFICANT CHANGE UP (ref 135–146)
WBC # BLD: 11.83 K/UL — HIGH (ref 4.8–10.8)
WBC # FLD AUTO: 11.83 K/UL — HIGH (ref 4.8–10.8)

## 2021-01-19 PROCEDURE — 99232 SBSQ HOSP IP/OBS MODERATE 35: CPT

## 2021-01-19 RX ADMIN — CARBAMIDE PEROXIDE 1 DROP(S): 81.86 SOLUTION/ DROPS AURICULAR (OTIC) at 05:19

## 2021-01-19 RX ADMIN — Medication 1 MILLIGRAM(S): at 21:13

## 2021-01-19 RX ADMIN — BUDESONIDE AND FORMOTEROL FUMARATE DIHYDRATE 2 PUFF(S): 160; 4.5 AEROSOL RESPIRATORY (INHALATION) at 21:15

## 2021-01-19 RX ADMIN — BUDESONIDE AND FORMOTEROL FUMARATE DIHYDRATE 2 PUFF(S): 160; 4.5 AEROSOL RESPIRATORY (INHALATION) at 08:51

## 2021-01-19 RX ADMIN — ENOXAPARIN SODIUM 40 MILLIGRAM(S): 100 INJECTION SUBCUTANEOUS at 05:19

## 2021-01-19 RX ADMIN — Medication 650 MILLIGRAM(S): at 17:09

## 2021-01-19 RX ADMIN — Medication 650 MILLIGRAM(S): at 11:49

## 2021-01-19 RX ADMIN — PANTOPRAZOLE SODIUM 40 MILLIGRAM(S): 20 TABLET, DELAYED RELEASE ORAL at 06:23

## 2021-01-19 RX ADMIN — CHLORHEXIDINE GLUCONATE 1 APPLICATION(S): 213 SOLUTION TOPICAL at 09:48

## 2021-01-19 RX ADMIN — ENOXAPARIN SODIUM 40 MILLIGRAM(S): 100 INJECTION SUBCUTANEOUS at 17:10

## 2021-01-19 RX ADMIN — TAMSULOSIN HYDROCHLORIDE 0.4 MILLIGRAM(S): 0.4 CAPSULE ORAL at 21:14

## 2021-01-19 RX ADMIN — Medication 650 MILLIGRAM(S): at 05:18

## 2021-01-19 RX ADMIN — CARBAMIDE PEROXIDE 1 DROP(S): 81.86 SOLUTION/ DROPS AURICULAR (OTIC) at 17:10

## 2021-01-19 RX ADMIN — Medication 6 MILLIGRAM(S): at 05:20

## 2021-01-19 NOTE — CHART NOTE - NSCHARTNOTEFT_GEN_A_CORE
Registered Dietitian Follow-Up     Patient Profile Reviewed                           Yes [x]   No []     Nutrition History Previously Obtained        Yes [x]  No []       Pertinent Subjective Information:     Pertinent Medical Interventions:  -COVID PNA; 1/16 (-) swab   -L leg pain      Diet order: DASH/TLC  Prosourc gelatin plus BID  Ensure Enlive BID     Anthropometrics:  - Ht. 71"  - Wt.   203.9lbs (1/11) -- likely d/t bed-scale error, will continue to monitor wt trends throughout adm   213.4lbs (1/9); dosing  - %wt change  - BMI 29.8 -- based on dosing  - IBW 172lbs     Pertinent Lab Data: (1/19) H/H 12.8/38.6, BUN 24, , A1c 5.9% (1/9)      Pertinent Meds: lovenox, senna, protonix, decadron      Physical Findings:  - Appearance: alert and oriented. No edema noted per RN flow sheets  - GI function: LBM 1/17, no c/o N+V, constipation or diarrhea   - Tubes: N/A  - Oral/Mouth cavity: No chewing/swallowing difficulties reported by staff   - Skin: WDL      Nutrition Requirements  Weight Used: 96.8kg CBW, 78g IBW -- continued from initial RD assessment (1/15)     Estimated Energy Needs    Continue []  Adjust []  1995-2176kcal (MSJ x 1.1-1.2 AF)         Estimated Protein Needs    Continue []  Adjust []  protein: 78-94g (1-1.2g/kg IBW) -- bmi borderline 30         Estimated Fluid Needs        Continue []  Adjust []  fluids: 1ml/kcal or per LIP     Nutrient Intake:        [x] Previous Nutrition Diagnosis:  inadequate protein energy intake            [] Ongoing          [] Resolved       Nutrition Intervention: meals and snacks, medical food supplements     Goal/Expected Outcome: Pt to meet greater than 75% of estimated needs within 7 days      Indicator/Monitoring: energy intake, body composition, NFPF    Recommendations:  1. Continue current diet order as tolerated   2. Continue with oral nutrition supplementation   3. Obtain new wt Registered Dietitian Follow-Up     Patient Profile Reviewed                           Yes [x]   No []     Nutrition History Previously Obtained        Yes [x]  No []       Pertinent Subjective Information: Per RN pt is eating well, ate >75% of his meals. Receiving oral nutrition supplements and eating them. No c/o N+V, constipation or diarrhea.      Pertinent Medical Interventions:  -COVID PNA; 1/16 (-) swab   -L leg pain      Diet order: DASH/TLC  Prosourc gelatin plus BID  Ensure Enlive BID     Anthropometrics:  - Ht. 71"  - Wt.   203.9lbs (1/11) -- likely d/t bed-scale error, will continue to monitor wt trends throughout adm   213.4lbs (1/9); dosing  - %wt change  - BMI 29.8 -- based on dosing  - IBW 172lbs     Pertinent Lab Data: (1/19) H/H 12.8/38.6, BUN 24, , A1c 5.9% (1/9)      Pertinent Meds: lovenox, senna, protonix, decadron      Physical Findings:  - Appearance: alert and oriented. No edema noted per RN flow sheets  - GI function: LBM 1/17, no c/o N+V, constipation or diarrhea   - Tubes: N/A  - Oral/Mouth cavity: No chewing/swallowing difficulties reported by staff   - Skin: WDL      Nutrition Requirements  Weight Used: 96.8kg CBW, 78g IBW -- continued from initial RD assessment (1/15)     Estimated Energy Needs    Continue []  Adjust []  1995-2176kcal (MSJ x 1.1-1.2 AF)         Estimated Protein Needs    Continue []  Adjust []  protein: 78-94g (1-1.2g/kg IBW) -- bmi borderline 30         Estimated Fluid Needs        Continue []  Adjust []  fluids: 1ml/kcal or per LIP     Nutrient Intake: >75% per RN (asked pt during phone call)         [x] Previous Nutrition Diagnosis:  inadequate protein energy intake            [] Ongoing          [] Resolved       Nutrition Intervention: meals and snacks, medical food supplements     Goal/Expected Outcome: Pt to meet greater than 75% of estimated needs within 7 days      Indicator/Monitoring: energy intake, body composition, NFPF    Recommendations:  1. Continue current diet order as tolerated   2. Continue with oral nutrition supplementation   3. Obtain new wt

## 2021-01-20 PROCEDURE — 99232 SBSQ HOSP IP/OBS MODERATE 35: CPT

## 2021-01-20 RX ORDER — LANOLIN ALCOHOL/MO/W.PET/CERES
3 CREAM (GRAM) TOPICAL AT BEDTIME
Refills: 0 | Status: DISCONTINUED | OUTPATIENT
Start: 2021-01-20 | End: 2021-01-22

## 2021-01-20 RX ADMIN — ENOXAPARIN SODIUM 40 MILLIGRAM(S): 100 INJECTION SUBCUTANEOUS at 17:29

## 2021-01-20 RX ADMIN — Medication 650 MILLIGRAM(S): at 11:47

## 2021-01-20 RX ADMIN — ENOXAPARIN SODIUM 40 MILLIGRAM(S): 100 INJECTION SUBCUTANEOUS at 06:15

## 2021-01-20 RX ADMIN — SENNA PLUS 2 TABLET(S): 8.6 TABLET ORAL at 21:33

## 2021-01-20 RX ADMIN — Medication 650 MILLIGRAM(S): at 17:29

## 2021-01-20 RX ADMIN — TAMSULOSIN HYDROCHLORIDE 0.4 MILLIGRAM(S): 0.4 CAPSULE ORAL at 21:33

## 2021-01-20 RX ADMIN — CARBAMIDE PEROXIDE 1 DROP(S): 81.86 SOLUTION/ DROPS AURICULAR (OTIC) at 06:15

## 2021-01-20 RX ADMIN — CHLORHEXIDINE GLUCONATE 1 APPLICATION(S): 213 SOLUTION TOPICAL at 09:31

## 2021-01-20 RX ADMIN — BUDESONIDE AND FORMOTEROL FUMARATE DIHYDRATE 2 PUFF(S): 160; 4.5 AEROSOL RESPIRATORY (INHALATION) at 07:33

## 2021-01-20 RX ADMIN — Medication 6 MILLIGRAM(S): at 06:14

## 2021-01-20 RX ADMIN — CARBAMIDE PEROXIDE 1 DROP(S): 81.86 SOLUTION/ DROPS AURICULAR (OTIC) at 17:29

## 2021-01-20 RX ADMIN — PANTOPRAZOLE SODIUM 40 MILLIGRAM(S): 20 TABLET, DELAYED RELEASE ORAL at 06:15

## 2021-01-20 RX ADMIN — Medication 1 MILLIGRAM(S): at 22:13

## 2021-01-20 RX ADMIN — BUDESONIDE AND FORMOTEROL FUMARATE DIHYDRATE 2 PUFF(S): 160; 4.5 AEROSOL RESPIRATORY (INHALATION) at 21:29

## 2021-01-20 RX ADMIN — Medication 650 MILLIGRAM(S): at 06:14

## 2021-01-20 NOTE — PROGRESS NOTE ADULT - ASSESSMENT
This is a 61 y/o male former smoker (2 packs since age of 17, quit 4 years ago) with past medical history of COPD (not on home oxygen) presented with worsening shortness of breath for one week. Patient originally tested positive for COVID on 12/28. He presented back to the hospital for worsening SOB. Course complicated by ICU upgrade secondary to acute desaturation on BiPAP. He was transitioned to HFNC and was subsequently downgraded to CEU. He was transitioned to nasal cannula and was subsequently downgraded to floors.     Plan:    Neurovascular:   -Anxiety: C/w Ativan, Atarax   Insomnia: complained of insomnia today, started melatonin PRN   -Pain well controlled with current regimen. PRN's: acetaminophen     Cardiovascular:   -Hemodynamically stable.   -Monitor: BP, HR, tele    Respiratory:   -COVID PNA    -PCR 1/18 COVID +, pending COVID test from today 1/20 (f/u results)     -S/p 10 day course of Dexamethasone 6mg   -COPD: c/w Symbicort, Duoneb, Spiriva   -Oxygenating 97% on 3lpm   -Encourage continued use of IS 10x/hr and frequent ambulation  -CXR: Low lung volume. Stable bilateral opacities. No evidence of pneumothorax.    GI:  -GI PPX: pantoprazole 40mg daily   -PO Diet: DASH/TLC   -Bowel Regimen: senna, miralax     Renal / :  -Continue to monitor renal function: BUN/Cr 24/0.8  -Monitor I/O's daily     Endocrine:    -No hx of DM or thyroid dx    Hematologic:  -CBC: H/H- 12/38  -Coagulation Panel.    ID:  -Temperature: afebrile   -CBC: WBC- 11.8   -Continue to observe for SIRS/Sepsis Syndrome.    Prophylaxis:  -DVT prophylaxis with Lovenox 40 Q12   -Continue with SCD's b/l while patient is at rest     Disposition:  -Once patient has 2 negatives, per patient request. Lives at home with brother with "lung condition" and doesn't want to expose him.   This is a 59 y/o male former smoker (2 packs since age of 17, quit 4 years ago) with past medical history of COPD (not on home oxygen) presented with worsening shortness of breath for one week. Patient originally tested positive for COVID on 12/28. He presented back to the hospital for worsening SOB. Course complicated by ICU upgrade secondary to acute desaturation on BiPAP. He was transitioned to HFNC and was subsequently downgraded to CEU. He was transitioned to nasal cannula and was subsequently downgraded to floors.     Plan:  Neurovascular:   -Anxiety: C/w Ativan, Atarax   Insomnia: complained of insomnia today, started melatonin PRN   -Pain well controlled with current regimen. PRN's: acetaminophen     Cardiovascular:   -Hemodynamically stable.   -Monitor: BP, HR, tele    Respiratory:   -COVID PNA    -PCR 1/18 COVID +, pending COVID test from today 1/20 (f/u results)     -S/p 10 day course of Dexamethasone 6mg   -COPD: c/w Symbicort, Duoneb, Spiriva   -Oxygenating 97% on 3lpm   -Encourage continued use of IS 10x/hr and frequent ambulation  -CXR: Low lung volume. Stable bilateral opacities. No evidence of pneumothorax.    GI:  -GI PPX: pantoprazole 40mg daily   -PO Diet: DASH/TLC   -Bowel Regimen: senna, miralax     Renal / :  -Continue to monitor renal function: BUN/Cr 24/0.8  -Monitor I/O's daily     Endocrine:    -No hx of DM or thyroid dx    Hematologic:  -CBC: H/H- 12/38  -Coagulation Panel.    ID:  -Temperature: afebrile   -CBC: WBC- 11.8   -Continue to observe for SIRS/Sepsis Syndrome.    Prophylaxis:  -DVT prophylaxis with Lovenox 40 Q12   -Continue with SCD's b/l while patient is at rest     Disposition:  -Once patient has 2 negatives, per patient request. Lives at home with brother with "lung condition" and doesn't want to expose him.

## 2021-01-20 NOTE — PROGRESS NOTE ADULT - SUBJECTIVE AND OBJECTIVE BOX
Medicine Progress Note    Patient is a 60y old  Male who initially presented with a chief complaint of Shortness of Breath (18 Jan 2021 12:16) and found to be COVID +    SUBJECTIVE / OVERNIGHT EVENTS:  Patient is doing well this morning, no complaints. Eating/drinking well. Denies any CP, palpitations, SOB, wheezing, abd pain, n/v/d/c, fevers or chills.    ADDITIONAL REVIEW OF SYSTEMS:    MEDICATIONS  (STANDING):  acetaminophen   Tablet .. 650 milliGRAM(s) Oral every 6 hours  albuterol/ipratropium for Nebulization 3 milliLiter(s) Nebulizer every 6 hours  budesonide 160 MICROgram(s)/formoterol 4.5 MICROgram(s) Inhaler 2 Puff(s) Inhalation two times a day  carbamide peroxide Otic Solution 1 Drop(s) Both Ears two times a day  chlorhexidine 4% Liquid 1 Application(s) Topical <User Schedule>  dexAMETHasone  Injectable 6 milliGRAM(s) IV Push daily  enoxaparin Injectable 40 milliGRAM(s) SubCutaneous every 12 hours  influenza   Vaccine 0.5 milliLiter(s) IntraMuscular once  pantoprazole    Tablet 40 milliGRAM(s) Oral before breakfast  senna 2 Tablet(s) Oral at bedtime  tamsulosin 0.4 milliGRAM(s) Oral at bedtime  tiotropium 18 MICROgram(s) Capsule 1 Capsule(s) Inhalation daily    MEDICATIONS  (PRN):  hydrOXYzine hydrochloride 25 milliGRAM(s) Oral every 6 hours PRN Anxiety  LORazepam     Tablet 1 milliGRAM(s) Oral every 8 hours PRN Anxiety    CAPILLARY BLOOD GLUCOSE    I&O's Summary    19 Jan 2021 07:01  -  20 Jan 2021 07:00  --------------------------------------------------------  IN: 0 mL / OUT: 600 mL / NET: -600 mL    PHYSICAL EXAM:  Vital Signs Last 24 Hrs  T(C): 36.6 (20 Jan 2021 07:42), Max: 36.6 (20 Jan 2021 07:42)  T(F): 97.8 (20 Jan 2021 07:42), Max: 97.8 (20 Jan 2021 07:42)  HR: 66 (20 Jan 2021 07:42) (66 - 80)  BP: 100/63 (20 Jan 2021 07:42) (100/63 - 119/72)  BP(mean): --  RR: 19 (20 Jan 2021 07:42) (16 - 19)  SpO2: 97% (20 Jan 2021 07:42) (96% - 97%)  CONSTITUTIONAL: NAD, well-developed, well-groomed  ENMT: Moist oral mucosa, no pharyngeal injection or exudates; normal dentition  RESPIRATORY: Normal respiratory effort; lungs are clear to auscultation bilaterally  CARDIOVASCULAR: Regular rate and rhythm, normal S1 and S2, no murmur/rub/gallop; No lower extremity edema; Peripheral pulses are 2+ bilaterally  ABDOMEN: Nontender to palpation, normoactive bowel sounds, no rebound/guarding; No hepatosplenomegaly  PSYCH: A+O to person, place, and time; affect appropriate  NEUROLOGY: CN 2-12 are intact and symmetric; no gross sensory deficits   SKIN: No rashes; no palpable lesions    LABS:                        12.8   11.83 )-----------( 315      ( 19 Jan 2021 02:00 )             38.6     01-19    137  |  102  |  24<H>  ----------------------------<  105<H>  5.0   |  21  |  0.8    Ca    8.7      19 Jan 2021 02:00  Mg     2.3     01-19    CARDIAC MARKERS ( 19 Jan 2021 02:00 )  x     / x     / 79 U/L / x     / x        COVID-19 PCR: Detected (18 Jan 2021 08:00)  COVID-19 PCR: NotDetec (16 Jan 2021 00:00)  COVID-19 PCR: Detected (08 Jan 2021 15:58)    RADIOLOGY & ADDITIONAL TESTS:    COORDINATION OF CARE:  Care Discussed with Consultants/Other Providers:

## 2021-01-20 NOTE — CHART NOTE - NSCHARTNOTEFT_GEN_A_CORE
Attempted to call patient's  brother but no response. Spoke to the patient at length though and stated he would reach out to his brother regarding his clinical status.

## 2021-01-21 LAB — SARS-COV-2 RNA SPEC QL NAA+PROBE: SIGNIFICANT CHANGE UP

## 2021-01-21 PROCEDURE — 99232 SBSQ HOSP IP/OBS MODERATE 35: CPT

## 2021-01-21 RX ADMIN — BUDESONIDE AND FORMOTEROL FUMARATE DIHYDRATE 2 PUFF(S): 160; 4.5 AEROSOL RESPIRATORY (INHALATION) at 11:31

## 2021-01-21 RX ADMIN — Medication 3 MILLILITER(S): at 11:30

## 2021-01-21 RX ADMIN — ENOXAPARIN SODIUM 40 MILLIGRAM(S): 100 INJECTION SUBCUTANEOUS at 18:00

## 2021-01-21 RX ADMIN — BUDESONIDE AND FORMOTEROL FUMARATE DIHYDRATE 2 PUFF(S): 160; 4.5 AEROSOL RESPIRATORY (INHALATION) at 11:29

## 2021-01-21 RX ADMIN — Medication 650 MILLIGRAM(S): at 18:00

## 2021-01-21 RX ADMIN — Medication 650 MILLIGRAM(S): at 11:31

## 2021-01-21 RX ADMIN — PANTOPRAZOLE SODIUM 40 MILLIGRAM(S): 20 TABLET, DELAYED RELEASE ORAL at 06:23

## 2021-01-21 RX ADMIN — SENNA PLUS 2 TABLET(S): 8.6 TABLET ORAL at 21:29

## 2021-01-21 RX ADMIN — Medication 6 MILLIGRAM(S): at 06:20

## 2021-01-21 RX ADMIN — ENOXAPARIN SODIUM 40 MILLIGRAM(S): 100 INJECTION SUBCUTANEOUS at 06:22

## 2021-01-21 RX ADMIN — Medication 3 MILLILITER(S): at 13:31

## 2021-01-21 RX ADMIN — Medication 650 MILLIGRAM(S): at 06:21

## 2021-01-21 RX ADMIN — TAMSULOSIN HYDROCHLORIDE 0.4 MILLIGRAM(S): 0.4 CAPSULE ORAL at 21:29

## 2021-01-21 NOTE — PROGRESS NOTE ADULT - SUBJECTIVE AND OBJECTIVE BOX
OVERNIGHT EVENTS:  No acute events overnight.    SUBJECTIVE / INTERVAL HPI: Patient seen and examined at bedside on RA.  no complaints at this time.     Denies fever/chills, fatigue, myalgias, cough, SOB, headache, sore throat, nasal congestion, nausea/vomiting/diarrhea, abd pain, poor appetite.      VITAL SIGNS:  Vital Signs Last 24 Hrs  T(C): 36.6 (21 Jan 2021 09:24), Max: 36.9 (20 Jan 2021 21:05)  T(F): 97.9 (21 Jan 2021 09:24), Max: 98.4 (20 Jan 2021 21:05)  HR: 98 (21 Jan 2021 09:24) (89 - 98)  BP: 102/65 (21 Jan 2021 09:24) (102/65 - 116/71)  BP(mean): --  RR: 19 (21 Jan 2021 09:24) (18 - 20)  SpO2: 97% (21 Jan 2021 09:24) (95% - 97%)    PHYSICAL EXAM:    General: Lying in bed, NAD,   HEENT: NC/AT, PERRL, anicteric sclera; MMM  Neck: supple  Cardiovascular: +S1/S2, RRR  Respiratory: CTA B/L,  Gastrointestinal: soft, NT/ND, +BS  Extremities: no edema or cyanosis  Vascular: 2+ radial, DP/PT pulses B/L  Neurological: AAOx3, no focal deficits    MEDICATIONS:  MEDICATIONS  (STANDING):  acetaminophen   Tablet .. 650 milliGRAM(s) Oral every 6 hours  albuterol/ipratropium for Nebulization 3 milliLiter(s) Nebulizer every 6 hours  budesonide 160 MICROgram(s)/formoterol 4.5 MICROgram(s) Inhaler 2 Puff(s) Inhalation two times a day  chlorhexidine 4% Liquid 1 Application(s) Topical <User Schedule>  dexAMETHasone  Injectable 6 milliGRAM(s) IV Push daily  enoxaparin Injectable 40 milliGRAM(s) SubCutaneous every 12 hours  influenza   Vaccine 0.5 milliLiter(s) IntraMuscular once  pantoprazole    Tablet 40 milliGRAM(s) Oral before breakfast  senna 2 Tablet(s) Oral at bedtime  tamsulosin 0.4 milliGRAM(s) Oral at bedtime  tiotropium 18 MICROgram(s) Capsule 1 Capsule(s) Inhalation daily    MEDICATIONS  (PRN):  hydrOXYzine hydrochloride 25 milliGRAM(s) Oral every 6 hours PRN Anxiety  LORazepam     Tablet 1 milliGRAM(s) Oral every 8 hours PRN Anxiety  melatonin 3 milliGRAM(s) Oral at bedtime PRN Insomnia      ALLERGIES:  Allergies    No Known Allergies    Intolerances        LABS:              CAPILLARY BLOOD GLUCOSE          RADIOLOGY & ADDITIONAL TESTS: Reviewed.

## 2021-01-21 NOTE — PROGRESS NOTE ADULT - ASSESSMENT
59yo male former smoker (2 packs since age of 17, quit 4 years ago) with past medical history of COPD (not on home oxygen) presented with worsening shortness of breath for one week. Patient originally tested positive for COVID on 12/28. He presented back to the hospital for worsening SOB. Course complicated by ICU upgrade secondary to acute desaturation on BiPAP. He was transitioned to HFNC and was subsequently downgraded to CEU. He was transitioned to nasal cannula and was subsequently downgraded to floors. Currently at [facility] for further management.     # COVID PNA  - CTA: bilateral ground-glass opacities (No PE)  - LE duplex: negative for DVT 1/9 and 1/18  - No ABX   - Inflammatory markers: Ferritin: 365, CRP: 4.64>1.95, Procal: 0.07>0.04, D-dimer: 369>260  -  Currently 95% on RA.   - finished 10 day course of decadron.   - Lovenox 40mg BID  - C/w Ativan PRN (tachypnea/respiratory distress seem to have anxiety component)  - PT consult  - incentive spirometer     # COPD  - Pt o2 sat stable at 975 on RA  - ambulating without dyspnea   - Will continue to monitor    # L leg pain  -started after walking to bathroom on 1/17; improving but persistent today. No redness or swelling  -LE duplex negative for DVT x 2  -tylenol PRN    Misc  DVT: Lovenox  Diet: DASH  GI: Protonix  Activity: as tolerated   Pending: PT     Disposition: D/C home when 2 covid results negative.

## 2021-01-22 ENCOUNTER — TRANSCRIPTION ENCOUNTER (OUTPATIENT)
Age: 61
End: 2021-01-22

## 2021-01-22 VITALS
RESPIRATION RATE: 19 BRPM | DIASTOLIC BLOOD PRESSURE: 63 MMHG | TEMPERATURE: 98 F | OXYGEN SATURATION: 96 % | HEART RATE: 98 BPM | SYSTOLIC BLOOD PRESSURE: 100 MMHG

## 2021-01-22 LAB — SARS-COV-2 RNA SPEC QL NAA+PROBE: SIGNIFICANT CHANGE UP

## 2021-01-22 PROCEDURE — 99238 HOSP IP/OBS DSCHRG MGMT 30/<: CPT

## 2021-01-22 RX ORDER — TAMSULOSIN HYDROCHLORIDE 0.4 MG/1
1 CAPSULE ORAL
Qty: 30 | Refills: 0
Start: 2021-01-22 | End: 2021-02-20

## 2021-01-22 RX ORDER — TIOTROPIUM BROMIDE 18 UG/1
1 CAPSULE ORAL; RESPIRATORY (INHALATION)
Qty: 1 | Refills: 0
Start: 2021-01-22

## 2021-01-22 RX ORDER — RIVAROXABAN 15 MG-20MG
1 KIT ORAL
Qty: 30 | Refills: 0
Start: 2021-01-22 | End: 2021-02-20

## 2021-01-22 RX ORDER — ALBUTEROL 90 UG/1
2 AEROSOL, METERED ORAL
Qty: 0 | Refills: 0 | DISCHARGE

## 2021-01-22 RX ORDER — APIXABAN 2.5 MG/1
1 TABLET, FILM COATED ORAL
Qty: 60 | Refills: 0
Start: 2021-01-22 | End: 2021-02-20

## 2021-01-22 RX ORDER — BUDESONIDE AND FORMOTEROL FUMARATE DIHYDRATE 160; 4.5 UG/1; UG/1
2 AEROSOL RESPIRATORY (INHALATION)
Qty: 1 | Refills: 0
Start: 2021-01-22

## 2021-01-22 RX ORDER — ALBUTEROL 90 UG/1
2 AEROSOL, METERED ORAL
Qty: 18 | Refills: 0
Start: 2021-01-22

## 2021-01-22 RX ORDER — TAMSULOSIN HYDROCHLORIDE 0.4 MG/1
1 CAPSULE ORAL
Qty: 0 | Refills: 0 | DISCHARGE
Start: 2021-01-22 | End: 2021-02-20

## 2021-01-22 RX ADMIN — Medication 3 MILLILITER(S): at 09:13

## 2021-01-22 RX ADMIN — Medication 650 MILLIGRAM(S): at 12:44

## 2021-01-22 RX ADMIN — BUDESONIDE AND FORMOTEROL FUMARATE DIHYDRATE 2 PUFF(S): 160; 4.5 AEROSOL RESPIRATORY (INHALATION) at 09:13

## 2021-01-22 RX ADMIN — Medication 650 MILLIGRAM(S): at 05:28

## 2021-01-22 RX ADMIN — ENOXAPARIN SODIUM 40 MILLIGRAM(S): 100 INJECTION SUBCUTANEOUS at 05:28

## 2021-01-22 RX ADMIN — PANTOPRAZOLE SODIUM 40 MILLIGRAM(S): 20 TABLET, DELAYED RELEASE ORAL at 05:28

## 2021-01-22 RX ADMIN — Medication 3 MILLILITER(S): at 05:29

## 2021-01-22 NOTE — PROGRESS NOTE ADULT - ASSESSMENT
59 yo M with PMHx of COPD (not on home O2), former smoker who presented with worsening sob x1 wk after testing covid+ on 12/28. Course complicated by acute desaturation on BiPAP after which pt was upgraded to ICU. Transitioned to HFNC and subsequently downgraded to CEU. Weaned to NC and transferred to [facility] for further management.    #Acute hypoxemic respiratory failure 2/2 COVID PNA  - satting 96-98% on RA  - s/p decadron  - CTA chest 1/8: negative for PE  - BLE duplex 1/19 and 1/18: negative for DVT  - covid 1/8(+), 1/16(-), 1/18(+), 1/20(-), 1/22 pending  - c/w ativan prn (tachypnea/respiratory distress seem to have anxiety component)  - incentive spirometry  - lovenox    #COPD  - stable  - symbicort, duonebs, tiotropium    #L leg pain - resolved  - BLE duplex 1/19 and 1/18: negative for DVT  - tylenol    #Anxiety  - atarax prn, lorazepam prn    DVT ppx: lovenox 40mg BID  GI ppx: protonix  Diet: DASH  Activity: as tolerated, PT  Dispo: d/c home when covid (-) x2 (brother at home with COPD)  Code: FULL CODE 59 yo M with PMHx of COPD (not on home O2), former smoker who presented with worsening sob x1 wk after testing covid+ on 12/28. Course complicated by acute desaturation on BiPAP after which pt was upgraded to ICU. Transitioned to HFNC and subsequently downgraded to CEU. Weaned to NC and transferred to [facility] for further management.    #Acute hypoxemic respiratory failure 2/2 COVID PNA  - satting 96-98% on RA  - s/p decadron  - CTA chest 1/8: negative for PE  - BLE duplex 1/19 and 1/18: negative for DVT  - covid 1/8(+), 1/16(-), 1/18(+), 1/20(-), 1/22 pending  - c/w ativan prn (tachypnea/respiratory distress seem to have anxiety component)  - incentive spirometry  - lovenox (transition to xarelto 10mg daily x30d on d/c)    #COPD  - stable  - symbicort, duonebs, tiotropium    #L leg pain - resolved  - BLE duplex 1/19 and 1/18: negative for DVT  - tylenol    #Anxiety  - atarax prn, lorazepam prn    DVT ppx: lovenox 40mg BID (transition to xarelto 10mg daily x30d on d/c)  GI ppx: protonix  Diet: DASH  Activity: as tolerated, PT  Dispo: d/c home when covid (-) x2 (brother at home with COPD)  Code: FULL CODE

## 2021-01-22 NOTE — PROGRESS NOTE ADULT - SUBJECTIVE AND OBJECTIVE BOX
61 yo M   HOD 14    OVERNIGHT EVENTS/SUBJECTIVE:   No acute events overnight. Pt states he feels fatigued while sitting up or walking to bathroom. Has been engaging in PT more. Awaiting d/c home once 2nd covid swab returns negative. No cp, sob, difficulty with urinating/BMs, change in appetite.    VITAL SIGNS:  Vital Signs Last 24 Hrs  T(C): 36.8 (22 Jan 2021 09:14), Max: 36.9 (22 Jan 2021 00:29)  T(F): 98.3 (22 Jan 2021 09:14), Max: 98.5 (22 Jan 2021 00:29)  HR: 85 (22 Jan 2021 09:14) (68 - 982)  BP: 103/63 (22 Jan 2021 09:14) (100/69 - 153/84)  BP(mean): --  RR: 20 (22 Jan 2021 09:14) (19 - 20)  SpO2: 95% (22 Jan 2021 09:14) (95% - 98%)    PHYSICAL EXAM:  GEN: no acute distress, on RA  RESP: lungs clear to auscultation bilaterally, no increased work of breathing  CV: regular rate, regular rhythm, 2+ radial pulses bilaterally  ABD: soft, nontender, nondistended  EXT: no edema, no calf tenderness  NEURO: A&Ox4, grossly normal    MEDICATIONS  (STANDING):  acetaminophen   Tablet .. 650 milliGRAM(s) Oral every 6 hours  albuterol/ipratropium for Nebulization 3 milliLiter(s) Nebulizer every 6 hours  budesonide 160 MICROgram(s)/formoterol 4.5 MICROgram(s) Inhaler 2 Puff(s) Inhalation two times a day  chlorhexidine 4% Liquid 1 Application(s) Topical <User Schedule>  enoxaparin Injectable 40 milliGRAM(s) SubCutaneous every 12 hours  influenza   Vaccine 0.5 milliLiter(s) IntraMuscular once  pantoprazole    Tablet 40 milliGRAM(s) Oral before breakfast  senna 2 Tablet(s) Oral at bedtime  tamsulosin 0.4 milliGRAM(s) Oral at bedtime  tiotropium 18 MICROgram(s) Capsule 1 Capsule(s) Inhalation daily    MEDICATIONS  (PRN):  hydrOXYzine hydrochloride 25 milliGRAM(s) Oral every 6 hours PRN Anxiety  LORazepam     Tablet 1 milliGRAM(s) Oral every 8 hours PRN Anxiety  melatonin 3 milliGRAM(s) Oral at bedtime PRN Insomnia    ALLERGIES:  No Known Allergies

## 2021-01-22 NOTE — PROGRESS NOTE ADULT - REASON FOR ADMISSION
Shortness of Breath

## 2021-01-22 NOTE — PROGRESS NOTE ADULT - PROVIDER SPECIALTY LIST ADULT
Internal Medicine
Hospitalist
Internal Medicine
Pulmonology
Infectious Disease
Infectious Disease
Internal Medicine
Critical Care

## 2021-01-22 NOTE — DISCHARGE NOTE NURSING/CASE MANAGEMENT/SOCIAL WORK - PATIENT PORTAL LINK FT
You can access the FollowMyHealth Patient Portal offered by [facility] by registering at the following website: [email or web address]. By joining [employer]’s FollowMyHealth portal, you will also be able to view your health information using other applications (apps) compatible with our system.

## 2021-01-22 NOTE — CHART NOTE - NSCHARTNOTEFT_GEN_A_CORE
Notified family: brother Chon  Update given: anticipate d/c home today pending covid result, brother can come  pt if d/c'ed before 9pm otherwise would be best to  tomorrow morning  All questions and concerns addressed to family's satisfaction.  Family encouraged to contact me with any further concerns.

## 2021-01-22 NOTE — CHART NOTE - NSCHARTNOTESELECT_GEN_ALL_CORE
Transfer Note
Event Note
Family Conversation/Event Note
Family Update
ICU downgrade/Transfer Note
Transfer
communication team/Event Note
family call/Event Note
family communication/Event Note
Hospitalist/Event Note

## 2021-01-28 DIAGNOSIS — Z87.891 PERSONAL HISTORY OF NICOTINE DEPENDENCE: ICD-10-CM

## 2021-01-28 DIAGNOSIS — U07.1 COVID-19: ICD-10-CM

## 2021-01-28 DIAGNOSIS — N40.0 BENIGN PROSTATIC HYPERPLASIA WITHOUT LOWER URINARY TRACT SYMPTOMS: ICD-10-CM

## 2021-01-28 DIAGNOSIS — J44.1 CHRONIC OBSTRUCTIVE PULMONARY DISEASE WITH (ACUTE) EXACERBATION: ICD-10-CM

## 2021-01-28 DIAGNOSIS — J12.82 PNEUMONIA DUE TO CORONAVIRUS DISEASE 2019: ICD-10-CM

## 2021-01-28 DIAGNOSIS — J96.01 ACUTE RESPIRATORY FAILURE WITH HYPOXIA: ICD-10-CM

## 2021-01-28 DIAGNOSIS — M79.661 PAIN IN RIGHT LOWER LEG: ICD-10-CM

## 2021-01-28 DIAGNOSIS — M79.662 PAIN IN LEFT LOWER LEG: ICD-10-CM

## 2021-01-28 DIAGNOSIS — D72.819 DECREASED WHITE BLOOD CELL COUNT, UNSPECIFIED: ICD-10-CM

## 2021-01-28 DIAGNOSIS — F41.9 ANXIETY DISORDER, UNSPECIFIED: ICD-10-CM

## 2021-04-01 ENCOUNTER — OUTPATIENT (OUTPATIENT)
Dept: OUTPATIENT SERVICES | Facility: HOSPITAL | Age: 61
LOS: 1 days | End: 2021-04-01
Payer: MEDICAID

## 2021-04-01 PROCEDURE — H0002: CPT

## 2021-05-12 DIAGNOSIS — Z71.89 OTHER SPECIFIED COUNSELING: ICD-10-CM

## 2021-07-05 ENCOUNTER — TRANSCRIPTION ENCOUNTER (OUTPATIENT)
Age: 61
End: 2021-07-05

## 2021-09-22 ENCOUNTER — RESULT REVIEW (OUTPATIENT)
Age: 61
End: 2021-09-22

## 2021-09-22 ENCOUNTER — OUTPATIENT (OUTPATIENT)
Dept: OUTPATIENT SERVICES | Facility: HOSPITAL | Age: 61
LOS: 1 days | Discharge: HOME | End: 2021-09-22
Payer: MEDICAID

## 2021-09-22 VITALS
TEMPERATURE: 97 F | HEART RATE: 67 BPM | RESPIRATION RATE: 17 BRPM | OXYGEN SATURATION: 96 % | WEIGHT: 210.1 LBS | SYSTOLIC BLOOD PRESSURE: 136 MMHG | DIASTOLIC BLOOD PRESSURE: 78 MMHG | HEIGHT: 71 IN

## 2021-09-22 DIAGNOSIS — M25.561 PAIN IN RIGHT KNEE: ICD-10-CM

## 2021-09-22 DIAGNOSIS — Z98.890 OTHER SPECIFIED POSTPROCEDURAL STATES: Chronic | ICD-10-CM

## 2021-09-22 DIAGNOSIS — Z01.818 ENCOUNTER FOR OTHER PREPROCEDURAL EXAMINATION: ICD-10-CM

## 2021-09-22 LAB
ALBUMIN SERPL ELPH-MCNC: 4.4 G/DL — SIGNIFICANT CHANGE UP (ref 3.5–5.2)
ALP SERPL-CCNC: 76 U/L — SIGNIFICANT CHANGE UP (ref 30–115)
ALT FLD-CCNC: 20 U/L — SIGNIFICANT CHANGE UP (ref 0–41)
ANION GAP SERPL CALC-SCNC: 11 MMOL/L — SIGNIFICANT CHANGE UP (ref 7–14)
APTT BLD: 34.8 SEC — SIGNIFICANT CHANGE UP (ref 27–39.2)
AST SERPL-CCNC: 20 U/L — SIGNIFICANT CHANGE UP (ref 0–41)
BASOPHILS # BLD AUTO: 0.05 K/UL — SIGNIFICANT CHANGE UP (ref 0–0.2)
BASOPHILS NFR BLD AUTO: 0.8 % — SIGNIFICANT CHANGE UP (ref 0–1)
BILIRUB SERPL-MCNC: 0.2 MG/DL — SIGNIFICANT CHANGE UP (ref 0.2–1.2)
BUN SERPL-MCNC: 20 MG/DL — SIGNIFICANT CHANGE UP (ref 10–20)
CALCIUM SERPL-MCNC: 9.1 MG/DL — SIGNIFICANT CHANGE UP (ref 8.5–10.1)
CHLORIDE SERPL-SCNC: 106 MMOL/L — SIGNIFICANT CHANGE UP (ref 98–110)
CO2 SERPL-SCNC: 24 MMOL/L — SIGNIFICANT CHANGE UP (ref 17–32)
CREAT SERPL-MCNC: 0.9 MG/DL — SIGNIFICANT CHANGE UP (ref 0.7–1.5)
EOSINOPHIL # BLD AUTO: 0.26 K/UL — SIGNIFICANT CHANGE UP (ref 0–0.7)
EOSINOPHIL NFR BLD AUTO: 4.3 % — SIGNIFICANT CHANGE UP (ref 0–8)
GLUCOSE SERPL-MCNC: 69 MG/DL — LOW (ref 70–99)
HCT VFR BLD CALC: 41.3 % — LOW (ref 42–52)
HGB BLD-MCNC: 13.3 G/DL — LOW (ref 14–18)
IMM GRANULOCYTES NFR BLD AUTO: 0.3 % — SIGNIFICANT CHANGE UP (ref 0.1–0.3)
INR BLD: 0.95 RATIO — SIGNIFICANT CHANGE UP (ref 0.65–1.3)
LYMPHOCYTES # BLD AUTO: 2.23 K/UL — SIGNIFICANT CHANGE UP (ref 1.2–3.4)
LYMPHOCYTES # BLD AUTO: 37 % — SIGNIFICANT CHANGE UP (ref 20.5–51.1)
MCHC RBC-ENTMCNC: 28.5 PG — SIGNIFICANT CHANGE UP (ref 27–31)
MCHC RBC-ENTMCNC: 32.2 G/DL — SIGNIFICANT CHANGE UP (ref 32–37)
MCV RBC AUTO: 88.6 FL — SIGNIFICANT CHANGE UP (ref 80–94)
MONOCYTES # BLD AUTO: 0.74 K/UL — HIGH (ref 0.1–0.6)
MONOCYTES NFR BLD AUTO: 12.3 % — HIGH (ref 1.7–9.3)
NEUTROPHILS # BLD AUTO: 2.73 K/UL — SIGNIFICANT CHANGE UP (ref 1.4–6.5)
NEUTROPHILS NFR BLD AUTO: 45.3 % — SIGNIFICANT CHANGE UP (ref 42.2–75.2)
NRBC # BLD: 0 /100 WBCS — SIGNIFICANT CHANGE UP (ref 0–0)
PLATELET # BLD AUTO: 289 K/UL — SIGNIFICANT CHANGE UP (ref 130–400)
POTASSIUM SERPL-MCNC: 4.4 MMOL/L — SIGNIFICANT CHANGE UP (ref 3.5–5)
POTASSIUM SERPL-SCNC: 4.4 MMOL/L — SIGNIFICANT CHANGE UP (ref 3.5–5)
PROT SERPL-MCNC: 7 G/DL — SIGNIFICANT CHANGE UP (ref 6–8)
PROTHROM AB SERPL-ACNC: 10.9 SEC — SIGNIFICANT CHANGE UP (ref 9.95–12.87)
RBC # BLD: 4.66 M/UL — LOW (ref 4.7–6.1)
RBC # FLD: 13.5 % — SIGNIFICANT CHANGE UP (ref 11.5–14.5)
SODIUM SERPL-SCNC: 141 MMOL/L — SIGNIFICANT CHANGE UP (ref 135–146)
WBC # BLD: 6.03 K/UL — SIGNIFICANT CHANGE UP (ref 4.8–10.8)
WBC # FLD AUTO: 6.03 K/UL — SIGNIFICANT CHANGE UP (ref 4.8–10.8)

## 2021-09-22 PROCEDURE — 71046 X-RAY EXAM CHEST 2 VIEWS: CPT | Mod: 26

## 2021-09-22 PROCEDURE — 93010 ELECTROCARDIOGRAM REPORT: CPT

## 2021-09-22 NOTE — H&P PST ADULT - NS PRO ABUSE SCREEN AFRAID ANYONE YN
Will continue existing regimen of Immunosuppressants:   on Mycophenolate + Tacrolimus and Prednisone , c/w Tacrolimus and prednisone, holding Cellcept for now while inpatient  - plan as above,  Nephrology f/u appreciated, tac dose increased per renal  hyponatremia: chronic pt on Nacl tabs, fluid restriction to 1 L, now resolved, per renal, will hold off on Nacl tabs for now no

## 2021-09-22 NOTE — H&P PST ADULT - HISTORY OF PRESENT ILLNESS
PATIENT DENIES CHEST PAIN, SHORTNESS OF BREATH, PALPITATIONS, COUGHING, FEVER, DYSURIA.  CAN WALK UP 2-3 FLIGHTS OF STEPS WITHOUT SOB.    NO COUGH, FEVER, SORE THROAT, HEADACHE, LOSS OF TASTE OR SMELL. NO KNOWN EXPOSURE TO ANYONE WITH COVID. PATIENT WAS INSTRUCTED TO ISOLATE FROM NOW UNTIL THE SURGERY.  FIRST DOSE OF MODERNA was 9/17/21.    Anesthesia Alert  NO--Difficult Airway  NO--History of neck surgery or radiation  NO--Limited ROM of neck  NO--History of Malignant hyperthermia  NO--Personal or family history of Pseudocholinesterase deficiency  NO--Prior Anesthesia Complication  NO--Latex Allergy  NO--Loose teeth  NO--History of Rheumatoid Arthritis  NO--KARIN  NO--bleeding risk

## 2021-09-22 NOTE — H&P PST ADULT - NSICDXPASTMEDICALHX_GEN_ALL_CORE_FT
PAST MEDICAL HISTORY:  2019 novel coronavirus disease (COVID-19) 1/2021    Acute respiratory failure with hypoxia d/t virus    BPH (benign prostatic hyperplasia)     COPD (chronic obstructive pulmonary disease)     ETOH abuse     H/O tinnitus

## 2021-09-22 NOTE — H&P PST ADULT - NSICDXFAMILYHX_GEN_ALL_CORE_FT
FAMILY HISTORY:  Father  Still living? No  Family history of respiratory failure, Age at diagnosis: Age Unknown    Mother  Still living? No  FH: lung cancer, Age at diagnosis: Age Unknown    Sibling  Still living? No  Family history of liver failure, Age at diagnosis: Age Unknown

## 2021-09-22 NOTE — H&P PST ADULT - REASON FOR ADMISSION
60 Y/O MALE HERE FOR PRE-ADMISSION SURGICAL TESTING. PATIENT REPORTS HE SLIPPED AND FELL 1 MO AGO AND TWISTED HIS RIGHT KNEE. MRI 2 WEEKS AGO  REVEALED + TORN MENISCUS  NOW FOR SCHEDULED PROCEDURE.

## 2021-10-04 ENCOUNTER — OUTPATIENT (OUTPATIENT)
Dept: OUTPATIENT SERVICES | Facility: HOSPITAL | Age: 61
LOS: 1 days | Discharge: HOME | End: 2021-10-04

## 2021-10-04 DIAGNOSIS — Z11.59 ENCOUNTER FOR SCREENING FOR OTHER VIRAL DISEASES: ICD-10-CM

## 2021-10-04 DIAGNOSIS — Z98.890 OTHER SPECIFIED POSTPROCEDURAL STATES: Chronic | ICD-10-CM

## 2021-10-04 PROBLEM — N40.0 BENIGN PROSTATIC HYPERPLASIA WITHOUT LOWER URINARY TRACT SYMPTOMS: Chronic | Status: ACTIVE | Noted: 2021-09-22

## 2021-10-04 PROBLEM — Z86.69 PERSONAL HISTORY OF OTHER DISEASES OF THE NERVOUS SYSTEM AND SENSE ORGANS: Chronic | Status: ACTIVE | Noted: 2021-09-22

## 2021-10-04 PROBLEM — J96.01 ACUTE RESPIRATORY FAILURE WITH HYPOXIA: Chronic | Status: ACTIVE | Noted: 2021-09-22

## 2021-10-04 PROBLEM — F10.10 ALCOHOL ABUSE, UNCOMPLICATED: Chronic | Status: ACTIVE | Noted: 2021-09-22

## 2021-10-04 PROBLEM — J44.9 CHRONIC OBSTRUCTIVE PULMONARY DISEASE, UNSPECIFIED: Chronic | Status: ACTIVE | Noted: 2021-09-22

## 2021-10-04 PROBLEM — U07.1 COVID-19: Chronic | Status: ACTIVE | Noted: 2021-09-22

## 2021-10-06 ENCOUNTER — OUTPATIENT (OUTPATIENT)
Dept: OUTPATIENT SERVICES | Facility: HOSPITAL | Age: 61
LOS: 1 days | Discharge: HOME | End: 2021-10-06
Payer: MEDICAID

## 2021-10-06 ENCOUNTER — RESULT REVIEW (OUTPATIENT)
Age: 61
End: 2021-10-06

## 2021-10-06 VITALS
DIASTOLIC BLOOD PRESSURE: 64 MMHG | RESPIRATION RATE: 20 BRPM | OXYGEN SATURATION: 99 % | WEIGHT: 210.1 LBS | TEMPERATURE: 98 F | SYSTOLIC BLOOD PRESSURE: 107 MMHG | HEIGHT: 71 IN | HEART RATE: 66 BPM

## 2021-10-06 VITALS — DIASTOLIC BLOOD PRESSURE: 77 MMHG | SYSTOLIC BLOOD PRESSURE: 107 MMHG

## 2021-10-06 DIAGNOSIS — Z98.890 OTHER SPECIFIED POSTPROCEDURAL STATES: Chronic | ICD-10-CM

## 2021-10-06 DIAGNOSIS — S83.249A OTHER TEAR OF MEDIAL MENISCUS, CURRENT INJURY, UNSPECIFIED KNEE, INITIAL ENCOUNTER: ICD-10-CM

## 2021-10-06 PROCEDURE — 88304 TISSUE EXAM BY PATHOLOGIST: CPT | Mod: 26

## 2021-10-06 RX ORDER — MORPHINE SULFATE 50 MG/1
2 CAPSULE, EXTENDED RELEASE ORAL
Refills: 0 | Status: DISCONTINUED | OUTPATIENT
Start: 2021-10-06 | End: 2021-10-06

## 2021-10-06 RX ORDER — SODIUM CHLORIDE 9 MG/ML
1000 INJECTION, SOLUTION INTRAVENOUS
Refills: 0 | Status: DISCONTINUED | OUTPATIENT
Start: 2021-10-06 | End: 2021-10-20

## 2021-10-06 RX ORDER — ACETAMINOPHEN 500 MG
650 TABLET ORAL ONCE
Refills: 0 | Status: DISCONTINUED | OUTPATIENT
Start: 2021-10-06 | End: 2021-10-20

## 2021-10-06 RX ORDER — HYDROMORPHONE HYDROCHLORIDE 2 MG/ML
0.5 INJECTION INTRAMUSCULAR; INTRAVENOUS; SUBCUTANEOUS
Refills: 0 | Status: DISCONTINUED | OUTPATIENT
Start: 2021-10-06 | End: 2021-10-06

## 2021-10-06 RX ORDER — ONDANSETRON 8 MG/1
4 TABLET, FILM COATED ORAL ONCE
Refills: 0 | Status: DISCONTINUED | OUTPATIENT
Start: 2021-10-06 | End: 2021-10-20

## 2021-10-06 RX ADMIN — SODIUM CHLORIDE 100 MILLILITER(S): 9 INJECTION, SOLUTION INTRAVENOUS at 14:25

## 2021-10-06 RX ADMIN — HYDROMORPHONE HYDROCHLORIDE 0.5 MILLIGRAM(S): 2 INJECTION INTRAMUSCULAR; INTRAVENOUS; SUBCUTANEOUS at 14:32

## 2021-10-06 RX ADMIN — HYDROMORPHONE HYDROCHLORIDE 0.5 MILLIGRAM(S): 2 INJECTION INTRAMUSCULAR; INTRAVENOUS; SUBCUTANEOUS at 15:00

## 2021-10-06 RX ADMIN — HYDROMORPHONE HYDROCHLORIDE 0.5 MILLIGRAM(S): 2 INJECTION INTRAMUSCULAR; INTRAVENOUS; SUBCUTANEOUS at 14:44

## 2021-10-06 NOTE — BRIEF OPERATIVE NOTE - NSICDXBRIEFPROCEDURE_GEN_ALL_CORE_FT
PROCEDURES:  Arthroscopic medial meniscectomy 06-Oct-2021 15:04:15  Paco Torres  Major arthroscopic synovectomy of knee 06-Oct-2021 15:04:21  Paco Torres  Chondroplasty of knee 06-Oct-2021 15:04:28  Paco Torres

## 2021-10-06 NOTE — ASU DISCHARGE PLAN (ADULT/PEDIATRIC) - CARE PROVIDER_API CALL
Paco Torres)  [facility]  [address] [place] [address] [place] [address]  Phone: [phone]  Fax: [phone]  Follow Up Time:

## 2021-10-06 NOTE — ASU DISCHARGE PLAN (ADULT/PEDIATRIC) - ASU DC SPECIAL INSTRUCTIONSFT
Post Operative Instructions for Knee Surgery    Your Surgery Included  [x ] Menisectomy  [ ] Meniscus Repair					  [x ] Synovectomy/Plica Excision	  [x ] Debridement/Chondroplasty  [ ] Lysis of Adhesions  [ ] ACL reconstruction			  [ ] PCL Reconstruction  [ ] Other:  	  Call our office ([phone]) immediately if you experience any of the following:  •	Excessive bleeding or pus like drainage at the incision site  •	Uncontrollable pain not relieved by pain medication  •	Excessive swelling or redness at the incision site  •	Fever above 101.5 degrees not controlled with Tylenol or Motrin  •	Shortness of Breath  •	Any foul odor or blistering from the surgery site    Pain Management: You were given one or more prescriptions before leaving the hospital. Have the prescriptions filled at a pharmacy on your way home and follow the instructions on the bottles.   Regional Anesthesia Injections (Blocks): You may have been given a regional nerve block either before or after surgery. This may numb your leg for 24-36 hours  	*Proceed with caution when weight bearing on your leg    Diet: Eat a bland diet for the first day after surgery. Progress your diet as tolerated. Constipation may occur with Narcotic usage, contact our office if you are experiencing constipation.    Activity: Limit your activity during the first 48 hours, keep your leg elevated with pillows under your heel. After the first 48 hours at home, increase your activity level based on your symptoms.    Dressing Change: Remove the dressing on the 3rd day. It is normal for some blood to be seen on the dressings. It is also normal for you to see apparent bruising on the skin around your knee when you remove the dressing. If present, leave the steri-strip tape across the incisions. If you are concerned by the drainage or the appearance of your knee, please call one of the numbers listed. Keep covered with Band-Aids/bandages.    Showering: You may shower on the 5th day after surgery if the wound is dry and clean, but do not let the wound soak in water until sutures are removed. Do not submerge in any water until after your postoperative appointment in clinic.    Weight Bearing:						  [x ] Weight bearing as tolerated		  [ ] Nonweight bearing				  [ ] Other:						    Operative Knee Range of Motion  [x ] Full range of motion  [ ] ROM 0-90 deg  [ ] Other:    Knee Exercises: You may do these exercises for 2-5 mins five times a day in order to help regain your range of motion.  [x ] Quad Sets: Begin activating your quadriceps muscle by driving your knee downward into full knee extension while seated on a table or bed   with a towel rolled and propped under your heel  [x ] Straight Leg Raise: While [place] your quadriceps muscle, lift your fully extended leg to the level of your non-operative knee  [x ] Heel Slides: With the knee straight, slide your heel slowly toward your buttocks, hold at the endpoint for 10-15 seconds, then slowly straighten  [x ] Ankle Pumps: With your knee straight, move your ankle in a "pumping"  fashion to activate your calf and leg muscle     Follow Up: As Scheduled

## 2021-10-11 LAB — SURGICAL PATHOLOGY STUDY: SIGNIFICANT CHANGE UP

## 2021-10-19 DIAGNOSIS — Z87.891 PERSONAL HISTORY OF NICOTINE DEPENDENCE: ICD-10-CM

## 2021-10-19 DIAGNOSIS — M23.221 DERANGEMENT OF POSTERIOR HORN OF MEDIAL MENISCUS DUE TO OLD TEAR OR INJURY, RIGHT KNEE: ICD-10-CM

## 2021-10-19 DIAGNOSIS — J44.9 CHRONIC OBSTRUCTIVE PULMONARY DISEASE, UNSPECIFIED: ICD-10-CM

## 2021-10-19 DIAGNOSIS — M65.861 OTHER SYNOVITIS AND TENOSYNOVITIS, RIGHT LOWER LEG: ICD-10-CM

## 2021-10-19 DIAGNOSIS — Z86.19 PERSONAL HISTORY OF OTHER INFECTIOUS AND PARASITIC DISEASES: ICD-10-CM

## 2021-10-19 DIAGNOSIS — M94.261 CHONDROMALACIA, RIGHT KNEE: ICD-10-CM

## 2021-12-01 PROCEDURE — G9005: CPT

## 2021-12-14 NOTE — ED ADULT NURSE NOTE - TEMPLATE
Chief Complaint   Patient presents with    Post-Op Check     right distal radius orif; dos: 12/02/2021    Wound Check     right arm    Suture / Staple Removal     right arm    Wrist Pain     right distal radius orif; numbness in thumb; soreness in right arm and shoulder; 7/10 pain; patient is not actively moving her fingers        OP:SURGEON: Dr. Katarina Ortiz MD  DATE OF PROCEDURE: 12-2-21  PROCEDURE: RIGHT DISTAL RADIUS OPEN REDUCTION INTERNAL FIXATION, intra-articular, greater than 3 fragments    POD: 2 weeks    Subjective:  Leopold Artis is following up from the above surgery. She is NWB on right upper extremity. She ambulates with no assistive device. Pain to extremity is mild and is not taking prescribed pain medication. They denies numbness or tingling to the right upper extremity. Denies calf pain, chest pain, or shortness of breath. Patient is not participating in therapy at this time. Patient is doing well after surgery. She states that the pain and swelling has decreased in her wrist and hand. Review of Systems -  All pertinent positives/negatives per HPI       Objective:    General: Alert and oriented X 3, normocephalic atraumatic, external ears and eye normal, sclera clear, no acute distress, respirations easy and unlabored with no audible wheezes, skin warm and dry, speech and dress appropriate for noted age, affect euthymic. Extremity:  Right Upper Extremity  Skin is clean dry and intact   mild edema noted to the wrist and hand  2+ Radial pulse, fingers warm with BCR  Flex/extension intact to wrist, thumb and fingers   Finger opposition intact  Finger adduction/abduction intact  Finger crossover intact  unable to make concentric fist due to stiffness  Wiggles fingers minimally due to stiffness  Subjectively states sensation is intact to light touch over the Median Nerve, Ulnar Nerve and Radial Nerve distribution  Incision well healed.   Sutures were removed and Steri-Strips applied. Temp 98 °F (36.7 °C)   LMP 11/22/2021     XR:   3 views right wrist demonstrate the ORIF still radius fracture with the plate and screws in stable position and alignment. No evidence of hardware loosening or failure. Assessment:   Diagnosis Orders   1. Other closed intra-articular fracture of distal end of right radius, subsequent encounter       Plan:  X-rays reviewed and discussed. Continue nonweightbearing right upper extremity. Patient was placed into a removable right wrist brace today. Okay to remove the brace for hygiene, therapy and when sitting around the house. Otherwise brace should be worn at all times including while sleeping. Occupational Therapy at [facility] starting next week following distal radius protocol at the 3-week vimal. If Steri-Strips do not fall off of your incision in 7 days on their own, okay to remove. Follow-up in 4 weeks for reevaluation and x-rays. Call if any questions or concerns. Electronically signed by SVEN Santa on 12/14/2021 at 10:45 AM  Note: This report was completed using computerAgInfoLink voiced recognition software. Every effort has been made to ensure accuracy; however, inadvertent computerized transcription errors may be present. General

## 2022-04-08 ENCOUNTER — OUTPATIENT (OUTPATIENT)
Dept: OUTPATIENT SERVICES | Facility: HOSPITAL | Age: 62
LOS: 1 days | Discharge: HOME | End: 2022-04-08
Payer: MEDICAID

## 2022-04-08 VITALS
WEIGHT: 216.05 LBS | SYSTOLIC BLOOD PRESSURE: 119 MMHG | OXYGEN SATURATION: 98 % | DIASTOLIC BLOOD PRESSURE: 71 MMHG | RESPIRATION RATE: 16 BRPM | HEART RATE: 83 BPM | TEMPERATURE: 97 F | HEIGHT: 71 IN

## 2022-04-08 DIAGNOSIS — Z98.890 OTHER SPECIFIED POSTPROCEDURAL STATES: Chronic | ICD-10-CM

## 2022-04-08 DIAGNOSIS — M65.341 TRIGGER FINGER, RIGHT RING FINGER: ICD-10-CM

## 2022-04-08 DIAGNOSIS — Z01.818 ENCOUNTER FOR OTHER PREPROCEDURAL EXAMINATION: ICD-10-CM

## 2022-04-08 LAB
ALBUMIN SERPL ELPH-MCNC: 4.5 G/DL — SIGNIFICANT CHANGE UP (ref 3.5–5.2)
ALP SERPL-CCNC: 78 U/L — SIGNIFICANT CHANGE UP (ref 30–115)
ALT FLD-CCNC: 14 U/L — SIGNIFICANT CHANGE UP (ref 0–41)
ANION GAP SERPL CALC-SCNC: 16 MMOL/L — HIGH (ref 7–14)
APTT BLD: 33.7 SEC — SIGNIFICANT CHANGE UP (ref 27–39.2)
AST SERPL-CCNC: 15 U/L — SIGNIFICANT CHANGE UP (ref 0–41)
BASOPHILS # BLD AUTO: 0.05 K/UL — SIGNIFICANT CHANGE UP (ref 0–0.2)
BASOPHILS NFR BLD AUTO: 0.9 % — SIGNIFICANT CHANGE UP (ref 0–1)
BILIRUB SERPL-MCNC: 0.3 MG/DL — SIGNIFICANT CHANGE UP (ref 0.2–1.2)
BUN SERPL-MCNC: 20 MG/DL — SIGNIFICANT CHANGE UP (ref 10–20)
CALCIUM SERPL-MCNC: 9 MG/DL — SIGNIFICANT CHANGE UP (ref 8.5–10.1)
CHLORIDE SERPL-SCNC: 106 MMOL/L — SIGNIFICANT CHANGE UP (ref 98–110)
CO2 SERPL-SCNC: 21 MMOL/L — SIGNIFICANT CHANGE UP (ref 17–32)
CREAT SERPL-MCNC: 0.9 MG/DL — SIGNIFICANT CHANGE UP (ref 0.7–1.5)
EGFR: 98 ML/MIN/1.73M2 — SIGNIFICANT CHANGE UP
EOSINOPHIL # BLD AUTO: 0.18 K/UL — SIGNIFICANT CHANGE UP (ref 0–0.7)
EOSINOPHIL NFR BLD AUTO: 3.4 % — SIGNIFICANT CHANGE UP (ref 0–8)
GLUCOSE SERPL-MCNC: 74 MG/DL — SIGNIFICANT CHANGE UP (ref 70–99)
HCT VFR BLD CALC: 41.4 % — LOW (ref 42–52)
HGB BLD-MCNC: 13.8 G/DL — LOW (ref 14–18)
IMM GRANULOCYTES NFR BLD AUTO: 0.2 % — SIGNIFICANT CHANGE UP (ref 0.1–0.3)
INR BLD: 0.96 RATIO — SIGNIFICANT CHANGE UP (ref 0.65–1.3)
LYMPHOCYTES # BLD AUTO: 2 K/UL — SIGNIFICANT CHANGE UP (ref 1.2–3.4)
LYMPHOCYTES # BLD AUTO: 37.5 % — SIGNIFICANT CHANGE UP (ref 20.5–51.1)
MCHC RBC-ENTMCNC: 28.8 PG — SIGNIFICANT CHANGE UP (ref 27–31)
MCHC RBC-ENTMCNC: 33.3 G/DL — SIGNIFICANT CHANGE UP (ref 32–37)
MCV RBC AUTO: 86.4 FL — SIGNIFICANT CHANGE UP (ref 80–94)
MONOCYTES # BLD AUTO: 0.5 K/UL — SIGNIFICANT CHANGE UP (ref 0.1–0.6)
MONOCYTES NFR BLD AUTO: 9.4 % — HIGH (ref 1.7–9.3)
NEUTROPHILS # BLD AUTO: 2.6 K/UL — SIGNIFICANT CHANGE UP (ref 1.4–6.5)
NEUTROPHILS NFR BLD AUTO: 48.6 % — SIGNIFICANT CHANGE UP (ref 42.2–75.2)
NRBC # BLD: 0 /100 WBCS — SIGNIFICANT CHANGE UP (ref 0–0)
PLATELET # BLD AUTO: 259 K/UL — SIGNIFICANT CHANGE UP (ref 130–400)
POTASSIUM SERPL-MCNC: 4.4 MMOL/L — SIGNIFICANT CHANGE UP (ref 3.5–5)
POTASSIUM SERPL-SCNC: 4.4 MMOL/L — SIGNIFICANT CHANGE UP (ref 3.5–5)
PROT SERPL-MCNC: 6.7 G/DL — SIGNIFICANT CHANGE UP (ref 6–8)
PROTHROM AB SERPL-ACNC: 11.1 SEC — SIGNIFICANT CHANGE UP (ref 9.95–12.87)
RBC # BLD: 4.79 M/UL — SIGNIFICANT CHANGE UP (ref 4.7–6.1)
RBC # FLD: 13.7 % — SIGNIFICANT CHANGE UP (ref 11.5–14.5)
SODIUM SERPL-SCNC: 143 MMOL/L — SIGNIFICANT CHANGE UP (ref 135–146)
WBC # BLD: 5.34 K/UL — SIGNIFICANT CHANGE UP (ref 4.8–10.8)
WBC # FLD AUTO: 5.34 K/UL — SIGNIFICANT CHANGE UP (ref 4.8–10.8)

## 2022-04-08 PROCEDURE — 93010 ELECTROCARDIOGRAM REPORT: CPT

## 2022-04-08 NOTE — H&P PST ADULT - NSANTHSNORERD_ENT_A_CORE
07/21/21 1400   Treatment   Time Off 1357   Observations & Evaluations   Level of Consciousness Alert (0)   Oriented X 4   Heart Rhythm Regular   Respiratory Quality/Effort Unlabored;Dyspnea with exertion   O2 Device Nasal cannula   Bilateral Breath Sounds Diminished   Skin Condition/Temp Dry;Flaky   Appetite Good   Abdomen Inspection Soft   Bowel Sounds (All Quadrants) Active   Edema None   Vital Signs   /64   Temp 98.3 °F (36.8 °C)   Pulse 95   Resp 18   Weight 194 lb 7.1 oz (88.2 kg)   Percent Weight Change -3.5   Pain Assessment   Pain Assessment 0-10   Pain Level 0   Post-Hemodialysis Assessment   Post-Treatment Procedures Blood returned;Catheter capped, clamped with Citrate x 2 ports   Machine Disinfection Process Acid/Vinegar Clean;Exterior Machine Disinfection; Heat Disinfect   Total Liters Processed (l/min) 98 l/min   Dialyzer Clearance Clotted   Duration of Treatment (minutes) 265 minutes   Hemodialysis Intake (ml) 1500 ml   Hemodialysis Output (ml) 4800 ml   NET Removed (ml) 3300 ml   Tolerated Treatment Good   Patient Response to Treatment tolerated well blood returned safely before system clotted   Provider Notification   Reason for Communication Review case   Provider Name [facility]   Provider Notification Nurse   Method of Communication   (hand off report) Yes

## 2022-04-08 NOTE — H&P PST ADULT - NSICDXPASTSURGICALHX_GEN_ALL_CORE_FT
PAST SURGICAL HISTORY:  H/O eye surgery     H/O right knee surgery     H/O transurethral resection of prostate

## 2022-04-08 NOTE — H&P PST ADULT - REASON FOR ADMISSION
61 yo male presents for PAST in preparation for right hand fourth digit trigger release on 4/28/2022 under [facility]SB by Dr. Rocha ([facility]).

## 2022-04-08 NOTE — H&P PST ADULT - HISTORY OF PRESENT ILLNESS
Pt states for many years he has had problems with his right hand fourth trigger finger. Complains of intermittent stabbing pain rated 10/10 specifically when it locks in place. States he tried shots years ago for pain and it had worked but now it no longer helps. Tylenol allows for minimal relief. Now for listed procedure.    Denies any chest pain, difficulty breathing, SOB, palpitations, dysuria, URI, or any other infections in the last 2 weeks. Denies any recent travel, contact, or exposure to any persons with known or suspected COVID-19. Pt also denies COVID testing within the last 2 weeks. Pt admits to receiving all doses of Moderna COVID vaccine. Denies any suicidal or homicidal ideations. Pt advised to self quarantine until day of procedure. Exercise tolerance of 2-3 flights of stairs without dyspnea. KARIN reviewed with patient. Pt verbalized understanding of all pre-operative instructions.    Anesthesia Alert  NO--Difficult Airway  NO--History of neck surgery or radiation  NO--Limited ROM of neck  NO--History of Malignant hyperthermia  NO--No personal or family history of Pseudocholinesterase deficiency.  NO--Prior Anesthesia Complication  NO--Latex Allergy  NO--Loose teeth  NO--History of Rheumatoid Arthritis  NO--KARIN  NO--Bleeding Risk  NO--Other_____

## 2022-04-25 ENCOUNTER — OUTPATIENT (OUTPATIENT)
Dept: OUTPATIENT SERVICES | Facility: HOSPITAL | Age: 62
LOS: 1 days | Discharge: HOME | End: 2022-04-25

## 2022-04-25 VITALS
DIASTOLIC BLOOD PRESSURE: 80 MMHG | RESPIRATION RATE: 16 BRPM | SYSTOLIC BLOOD PRESSURE: 135 MMHG | HEIGHT: 71 IN | TEMPERATURE: 96 F | WEIGHT: 218.92 LBS | HEART RATE: 73 BPM | OXYGEN SATURATION: 94 %

## 2022-04-25 DIAGNOSIS — Z01.818 ENCOUNTER FOR OTHER PREPROCEDURAL EXAMINATION: ICD-10-CM

## 2022-04-25 DIAGNOSIS — Z98.890 OTHER SPECIFIED POSTPROCEDURAL STATES: Chronic | ICD-10-CM

## 2022-04-25 DIAGNOSIS — M65.341 TRIGGER FINGER, RIGHT RING FINGER: ICD-10-CM

## 2022-04-25 NOTE — H&P PST ADULT - HISTORY OF PRESENT ILLNESS
C3 nurse attempted to contact patient. No answer. The following message was left for the patient to return the call:  Good afternoon,  I am a nurse calling on behalf of [facility] from the [facility].  This is a Transitional Care Call for Erendira Pretty. When you have a moment please contact us at [phone] or [phone] Monday through Friday, between the hours of 8 am to 4 pm. We look forward to speaking with you. On behalf of [facility] have a nice day.    The patient has a scheduled HOSFU appointment with Cortes Roblero MD on 3/9 @ 1000. Message sent to Physician staff.       61 yo male presents for PAST in preparation for RIGHT HAND FOURTH DIGIT TRIGGER RELEASE  Laterality: RightLength of Procedure: 45 MinutesAnesthesia Type: Local Standby  Pt complains of chronic right 4th digit pain, had multiple injections, last one 1.5 months ago with minimal relief. Pain is rated 7-8/10, "moving my hand makes at worse, I cant close my fist"  Denies any chest pain, difficulty breathing, SOB, palpitations, dysuria, URI, or any other infections in the last 2 weeks/1 month. Denies any recent travel, contact, or exposure to any persons with known or suspected COVID-19. Pt also denies COVID testing within the last 2 weeks. Pt advised to self quarantine until day of procedure. Exercise tolerance of 2-3 flights of stairs without dyspnea. KARIN reviewed with patient.   Pt can walk one mile w/o SOB    Anesthesia Alert  NO--Difficult Airway  NO--History of neck surgery or radiation  NO--Limited ROM of neck  NO--History of Malignant hyperthermia  NO--Personal or family history of Pseudocholinesterase deficiency.  NO--Prior Anesthesia Complication  NO--Latex Allergy  NO--Loose teeth  NO--History of Rheumatoid Arthritis  NO--KARIN  NO--Bleeding risk  NO--Other_____   written and verbal instructions with teach back on chlorhexidine shampoo provided,  pt verbalized understanding with returned demonstration  Patient verbalized understanding of instructions and was given the opportunity to ask questions and have them answered.

## 2022-04-25 NOTE — H&P PST ADULT - DENTITION
PHYSICIAN NEXT STEPS:  Review Only    CHIEF COMPLAINT:  Chief Complaint/Protocol Used: Dizziness - Lightheadedness  Onset: 2 days ago      ASSESSMENT:  ? Onset: 2 days ago  ? Description: Woozy feeling  ? Severity: Mild to moderate  ? Onset: 2 days ago  ? Aggravating Factors: Standing up aggravates it  ? Cause: \"Might be sinuses.\"  ? Other Symptoms: Mild frontal headaches, /93 today, intermittent mild mid upper abdominal pain  -------------------------------------------------------    DISPOSITION:  Disposition Recommendation: See Physician within 24 Hours  Questions that led to disposition:  ? [1] MODERATE dizziness (e.g., interferes with normal activities) AND [2] has NOT been evaluated by physician for this (Exception: dizziness caused by heat exposure, sudden standing, or poor fluid intake)  Patient Directed To: Unspecified  Patient Intended Action: Seek care in the doctor's office       CALL NOTES:  07/30/2019 at 8:36 AM by Eleanor Rust  ? Appointment made for patient at [facility] with Dr. Pedroza on 7/30/19 at 3 pm.    DISPOSITION OVERRIDE/PROVIDER CONSULT:  Disposition Override: See Physician within 4 Hours (or PCP triage)  Override Source: Nurse clinical judgment  Consulted with PCP: No  Consulted with On-Call Physician: No    CALLER CONTACT INFO:  Name: Loc London (Self)  Phone 1: [phone] (Mobile) - Preferred      ENCOUNTER STARTED:  07/30/19 08:18:09 AM  ENCOUNTER ASSIGNED TO/CLOSED BY:  Eleanor Rust @ 07/30/19 08:36:48 AM      -------------------------------------------------------    CARE ADVICE given per Dizziness - Lightheadedness guideline.  FLUIDS: Drink several glasses of fruit juice, other clear fluids or water. This will improve hydration and blood glucose. If the weather is hot, make sure the fluids are cold.      UNDERSTANDS CARE ADVICE: Yes    AGREES WITH CARE ADVICE: No    WILL FOLLOW CARE ADVICE: No    -------------------------------------------------------   normal

## 2022-04-25 NOTE — H&P PST ADULT - REASON FOR ADMISSION
Suite: CASProceduralist: Catherine Rocha  Confirmed Surgery DateTime: 05-   Procedure: RIGHT HAND FOURTH DIGIT TRIGGER RELEASE  Laterality: RightLength of Procedure: 45 MinutesAnesthesia Type: Local Standby

## 2022-05-16 ENCOUNTER — LABORATORY RESULT (OUTPATIENT)
Age: 62
End: 2022-05-16

## 2022-05-18 NOTE — ASU PATIENT PROFILE, ADULT - FALL HARM RISK - UNIVERSAL INTERVENTIONS
Bed in lowest position, wheels locked, appropriate side rails in place/Call bell, personal items and telephone in reach/Instruct patient to call for assistance before getting out of bed or chair/Non-slip footwear when patient is out of bed/[place] to call system/Physically safe environment - no spills, clutter or unnecessary equipment/Purposeful Proactive Rounding/Room/bathroom lighting operational, light cord in reach

## 2022-05-19 ENCOUNTER — OUTPATIENT (OUTPATIENT)
Dept: OUTPATIENT SERVICES | Facility: HOSPITAL | Age: 62
LOS: 1 days | Discharge: HOME | End: 2022-05-19

## 2022-05-19 ENCOUNTER — TRANSCRIPTION ENCOUNTER (OUTPATIENT)
Age: 62
End: 2022-05-19

## 2022-05-19 VITALS
OXYGEN SATURATION: 97 % | DIASTOLIC BLOOD PRESSURE: 74 MMHG | HEART RATE: 63 BPM | RESPIRATION RATE: 20 BRPM | SYSTOLIC BLOOD PRESSURE: 136 MMHG

## 2022-05-19 VITALS
OXYGEN SATURATION: 97 % | RESPIRATION RATE: 18 BRPM | HEIGHT: 71 IN | HEART RATE: 74 BPM | WEIGHT: 218.92 LBS | TEMPERATURE: 98 F | SYSTOLIC BLOOD PRESSURE: 120 MMHG | DIASTOLIC BLOOD PRESSURE: 85 MMHG

## 2022-05-19 DIAGNOSIS — Z98.890 OTHER SPECIFIED POSTPROCEDURAL STATES: Chronic | ICD-10-CM

## 2022-05-19 RX ORDER — SODIUM CHLORIDE 9 MG/ML
1000 INJECTION, SOLUTION INTRAVENOUS
Refills: 0 | Status: DISCONTINUED | OUTPATIENT
Start: 2022-05-19 | End: 2022-06-02

## 2022-05-19 RX ORDER — ONDANSETRON 8 MG/1
4 TABLET, FILM COATED ORAL ONCE
Refills: 0 | Status: DISCONTINUED | OUTPATIENT
Start: 2022-05-19 | End: 2022-06-02

## 2022-05-19 RX ORDER — HYDROMORPHONE HYDROCHLORIDE 2 MG/ML
1 INJECTION INTRAMUSCULAR; INTRAVENOUS; SUBCUTANEOUS
Refills: 0 | Status: DISCONTINUED | OUTPATIENT
Start: 2022-05-19 | End: 2022-05-19

## 2022-05-19 NOTE — CHART NOTE - NSCHARTNOTEFT_GEN_A_CORE
PACU ANESTHESIA ADMISSION NOTE      Procedure: Release, A1 pulley  r rf      Post op diagnosis:  Trigger finger, right ring finger        ____  Intubated  TV:______       Rate: ______      FiO2: ______    _x___  Patent Airway    __x__  Full return of protective reflexes    _x___  Full recovery from anesthesia / back to baseline     Vitals:   T: 98          R:   12               BP:  117/78                Sat:100                   P: 80      Mental Status:  ___x_ Awake   __x___ Alert   _____ Drowsy   _____ Sedated    Nausea/Vomiting:  __x__ NO  ______Yes,   See Post - Op Orders          Pain Scale (0-10):  _____    Treatment: __x__ None    ____ See Post - Op/PCA Orders    Post - Operative Fluids:   ____ Oral   _x___ See Post - Op Orders    Plan: Discharge:   _x___Home       _____Floor     _____Critical Care    _____  Other:_________________    Comments:

## 2022-05-25 DIAGNOSIS — Z86.69 PERSONAL HISTORY OF OTHER DISEASES OF THE NERVOUS SYSTEM AND SENSE ORGANS: ICD-10-CM

## 2022-05-25 DIAGNOSIS — N40.0 BENIGN PROSTATIC HYPERPLASIA WITHOUT LOWER URINARY TRACT SYMPTOMS: ICD-10-CM

## 2022-05-25 DIAGNOSIS — M65.341 TRIGGER FINGER, RIGHT RING FINGER: ICD-10-CM

## 2022-05-25 DIAGNOSIS — Z86.16 PERSONAL HISTORY OF COVID-19: ICD-10-CM

## 2022-05-25 DIAGNOSIS — F17.210 NICOTINE DEPENDENCE, CIGARETTES, UNCOMPLICATED: ICD-10-CM

## 2022-05-25 DIAGNOSIS — J44.9 CHRONIC OBSTRUCTIVE PULMONARY DISEASE, UNSPECIFIED: ICD-10-CM

## 2022-05-25 DIAGNOSIS — Z83.6 FAMILY HISTORY OF OTHER DISEASES OF THE RESPIRATORY SYSTEM: ICD-10-CM

## 2022-06-22 ENCOUNTER — APPOINTMENT (OUTPATIENT)
Dept: ORTHOPEDIC SURGERY | Facility: CLINIC | Age: 62
End: 2022-06-22
Payer: MEDICAID

## 2022-06-22 DIAGNOSIS — M65.341 TRIGGER FINGER, RIGHT RING FINGER: ICD-10-CM

## 2022-06-22 PROCEDURE — 99024 POSTOP FOLLOW-UP VISIT: CPT

## 2022-06-22 NOTE — IMAGING
[de-identified] :   Right hand:  tender palpation over incision site, full range of motion of fingers however slightly decreased  strength and range of motion of PIP joint by 5° compared to other hand and fingers, neurovascularly intact

## 2022-06-22 NOTE — ASSESSMENT
[FreeTextEntry1] :  patient is status post right ring finger trigger finger release.  He still having some discomfort in the palm.  Told will take some time.  I recommend the patient work on scar massage.  He is also going to do therapy to work on the range of motion.  He will follow up back in 6 weeks or so.

## 2022-06-22 NOTE — HISTORY OF PRESENT ILLNESS
[de-identified] : Patient comes in status post right ring finger trigger finger release.  He says he has pain in the palm he also cannot make the same fist that he made prior to having this problem.  He finds that he stiff in the ring finger.  He is using his hand normally.  He has been working on scar massage but not putting significant pressure.

## 2022-08-03 ENCOUNTER — APPOINTMENT (OUTPATIENT)
Dept: ORTHOPEDIC SURGERY | Facility: CLINIC | Age: 62
End: 2022-08-03

## 2022-08-29 ENCOUNTER — NON-APPOINTMENT (OUTPATIENT)
Age: 62
End: 2022-08-29

## 2023-01-03 NOTE — DISCHARGE NOTE NURSING/CASE MANAGEMENT/SOCIAL WORK - NSDCPEXARELTOCOMP_GEN_ALL_CORE
Rivaroxaban/Xarelto is used to treat and prevent blood clots.  If you are not able to swallow the tablets whole, you may crush the tablets and mix them with a small amount of applesauce and promptly take within four hours. Eat some food right after you swallow the mixture. Take 2.5mg or 10mg tablets of Rivaroxaban/Xarelto with or without food. Take 15mg or 20mg tablets of Rivaroxaban/Xarelto with food. Never skip a dose of Rivaroxaban/Xarelto.  If you take Rivaroxaban/Xarelto once a day and you forget to take your dose, take a dose as soon as you remember on the same day. If you take Rivaroxaban/Xarelto 2.5 mg twice a day and you forget to take your dose, skip the missed dose and take your next dose at your usual time. DO NOT take an extra pill to ‘catch up’. If you take Rivaroxaban/Xarelto 15 mg twice a day and you forget to take your dose, take a dose as soon as you remember on the same day. You may take 2 doses at the same time to make up for missed dose ONLY if you take a total of 30mg per day. Notify your doctor that you missed a dose. Take Rivaroxaban/Xarelto at the same time each morning and evening. Rivaroxaban/Xarelto may be taken with other medication or food. Consent 2/Introductory Paragraph: Mohs surgery was explained to the patient and consent was obtained. The risks, benefits and alternatives to therapy were discussed in detail. Specifically, the risks of infection, scarring, bleeding, prolonged wound healing, incomplete removal, allergy to anesthesia, nerve injury and recurrence were addressed. Prior to the procedure, the treatment site was clearly identified and confirmed by the patient. All components of Universal Protocol/PAUSE Rule completed.

## 2023-01-26 NOTE — ED PROVIDER NOTE - MDM ORDERS SUBMITTED SELECTION
Labs/EKG/Imaging Studies/Medications Complex Repair And M Plasty Text: The defect edges were debeveled with a #15 scalpel blade.  The primary defect was closed partially with a complex linear closure.  Given the location of the remaining defect, shape of the defect and the proximity to free margins an M plasty was deemed most appropriate for complete closure of the defect.  Using a sterile surgical marker, an appropriate advancement flap was drawn incorporating the defect and placing the expected incisions within the relaxed skin tension lines where possible.    The area thus outlined was incised deep to adipose tissue with a #15 scalpel blade.  The skin margins were undermined to an appropriate distance in all directions utilizing iris scissors.

## 2023-08-30 NOTE — PHYSICAL THERAPY INITIAL EVALUATION ADULT - CRITERIA FOR SKILLED THERAPEUTIC INTERVENTIONS
Paperwork signed by provider and patient. Faxed to [phone].   impairments found/rehab potential/therapy frequency

## 2024-06-06 ENCOUNTER — EMERGENCY (EMERGENCY)
Facility: HOSPITAL | Age: 64
LOS: 0 days | Discharge: HOME | End: 2024-06-06
Attending: STUDENT IN AN ORGANIZED HEALTH CARE EDUCATION/TRAINING PROGRAM
Payer: MEDICAID

## 2024-06-06 VITALS
WEIGHT: 214.95 LBS | DIASTOLIC BLOOD PRESSURE: 68 MMHG | TEMPERATURE: 99 F | OXYGEN SATURATION: 99 % | RESPIRATION RATE: 18 BRPM | HEART RATE: 83 BPM | SYSTOLIC BLOOD PRESSURE: 116 MMHG

## 2024-06-06 DIAGNOSIS — K08.89 OTHER SPECIFIED DISORDERS OF TEETH AND SUPPORTING STRUCTURES: ICD-10-CM

## 2024-06-06 DIAGNOSIS — Z98.890 OTHER SPECIFIED POSTPROCEDURAL STATES: Chronic | ICD-10-CM

## 2024-06-06 PROCEDURE — 96372 THER/PROPH/DIAG INJ SC/IM: CPT

## 2024-06-06 PROCEDURE — 99283 EMERGENCY DEPT VISIT LOW MDM: CPT

## 2024-06-06 PROCEDURE — 99283 EMERGENCY DEPT VISIT LOW MDM: CPT | Mod: 25

## 2024-06-06 RX ORDER — AMOXICILLIN 250 MG/5ML
1 SUSPENSION, RECONSTITUTED, ORAL (ML) ORAL
Refills: 0
Start: 2024-06-06

## 2024-06-06 RX ORDER — KETOROLAC TROMETHAMINE 30 MG/ML
60 SYRINGE (ML) INJECTION ONCE
Refills: 0 | Status: DISCONTINUED | OUTPATIENT
Start: 2024-06-06 | End: 2024-06-06

## 2024-06-06 RX ORDER — AMOXICILLIN 250 MG/5ML
875 SUSPENSION, RECONSTITUTED, ORAL (ML) ORAL ONCE
Refills: 0 | Status: COMPLETED | OUTPATIENT
Start: 2024-06-06 | End: 2024-06-06

## 2024-06-06 RX ADMIN — Medication 60 MILLIGRAM(S): at 22:59

## 2024-06-06 RX ADMIN — Medication 875 MILLIGRAM(S): at 22:59

## 2024-06-06 NOTE — ED ADULT NURSE NOTE - NSFALLUNIVINTERV_ED_ALL_ED
98
Bed/Stretcher in lowest position, wheels locked, appropriate side rails in place/Call bell, personal items and telephone in reach/Instruct patient to call for assistance before getting out of bed/chair/stretcher/Non-slip footwear applied when patient is off stretcher/[place] to call system/Physically safe environment - no spills, clutter or unnecessary equipment/Purposeful proactive rounding/Room/bathroom lighting operational, light cord in reach

## 2024-06-06 NOTE — ED PROVIDER NOTE - CLINICAL SUMMARY MEDICAL DECISION MAKING FREE TEXT BOX
A/P: Likely caries and dental infection. Will transfer to dental. No sign of deep infection, airway compromise, or spread into surrounding structures.

## 2024-06-06 NOTE — ED PROVIDER NOTE - PHYSICAL EXAMINATION
No submandibular or floor or mouth swelling, voice change, odynophagia or drooling, fever, chills, or inhaled tooth fragment.   Exam shows caries, no inflamed alveolus, no discharge of purulence or abscess.

## 2024-06-06 NOTE — ED PROVIDER NOTE - PATIENT PORTAL LINK FT
You can access the FollowMyHealth Patient Portal offered by [facility] by registering at the following website: [email or web address]. By joining [facility]’s FollowMyHealth portal, you will also be able to view your health information using other applications (apps) compatible with our system.

## 2024-11-03 ENCOUNTER — NON-APPOINTMENT (OUTPATIENT)
Age: 64
End: 2024-11-03

## 2025-05-27 NOTE — DISCHARGE NOTE PROVIDER - PROVIDER RX CONTACT NUMBER
[phone]
Admitted from [facility] for new onset pus in wound and +epistaxis. PMH significant for recent prolonged admission s/p "incarcerated ventral hernia w/ sbo s/p lap converted to open ventral hernia repair w/ mesh and small bowel resection and s/p ex lap, 30 cm sbr, creation of new side to side ileoileostomy, ventral hernia repair w/ mesh".